# Patient Record
Sex: FEMALE | Race: BLACK OR AFRICAN AMERICAN | URBAN - METROPOLITAN AREA
[De-identification: names, ages, dates, MRNs, and addresses within clinical notes are randomized per-mention and may not be internally consistent; named-entity substitution may affect disease eponyms.]

---

## 2017-12-02 PROBLEM — E87.1 HYPONATREMIA: Status: ACTIVE | Noted: 2017-12-02

## 2017-12-03 PROBLEM — I48.20 CHRONIC ATRIAL FIBRILLATION (HCC): Status: ACTIVE | Noted: 2017-12-03

## 2017-12-03 PROBLEM — N39.0 URINARY TRACT INFECTION WITHOUT HEMATURIA: Status: ACTIVE | Noted: 2017-12-03

## 2017-12-03 PROBLEM — M21.331 WRIST DROP, ACQUIRED, RIGHT: Status: ACTIVE | Noted: 2017-12-03

## 2017-12-03 PROBLEM — I10 ESSENTIAL HYPERTENSION: Status: ACTIVE | Noted: 2017-12-03

## 2018-01-05 ENCOUNTER — TELEPHONE (OUTPATIENT)
Dept: ORTHOPEDIC SURGERY | Age: 78
End: 2018-01-05

## 2018-01-05 ENCOUNTER — OFFICE VISIT (OUTPATIENT)
Dept: ORTHOPEDIC SURGERY | Age: 78
End: 2018-01-05

## 2018-01-05 DIAGNOSIS — M21.331 WRIST DROP, ACQUIRED, RIGHT: Primary | ICD-10-CM

## 2018-01-05 PROCEDURE — 99213 OFFICE O/P EST LOW 20 MIN: CPT | Performed by: ORTHOPAEDIC SURGERY

## 2018-01-05 PROCEDURE — L3908 WHO COCK-UP NONMOLDE PRE OTS: HCPCS | Performed by: ORTHOPAEDIC SURGERY

## 2018-01-05 NOTE — PROGRESS NOTES
lesions. Strength and tone are normal.          Assessment :  Right wrist drop    Impression:  Encounter Diagnosis   Name Primary?  Wrist drop, acquired, right Yes       Office Procedures:  No orders of the defined types were placed in this encounter. Treatment Plan:  I discussed diagnosis and prognosis with her today. Going to place her into a cockup wrist brace. I did discuss that this is just going to require time and it could take up to a year plus for this to fully return. In going to see her back in 6 weeks to see if she is getting any return of the radial nerve.   If no return we could consider dictating an EMG at that time

## 2023-09-18 ENCOUNTER — APPOINTMENT (OUTPATIENT)
Dept: GENERAL RADIOLOGY | Age: 83
DRG: 058 | End: 2023-09-18
Payer: MEDICAID

## 2023-09-18 ENCOUNTER — HOSPITAL ENCOUNTER (INPATIENT)
Age: 83
LOS: 3 days | Discharge: HOME | DRG: 058 | End: 2023-09-21
Attending: EMERGENCY MEDICINE | Admitting: STUDENT IN AN ORGANIZED HEALTH CARE EDUCATION/TRAINING PROGRAM
Payer: MEDICAID

## 2023-09-18 ENCOUNTER — APPOINTMENT (OUTPATIENT)
Dept: CT IMAGING | Age: 83
DRG: 058 | End: 2023-09-18
Payer: MEDICAID

## 2023-09-18 DIAGNOSIS — E83.42 HYPOMAGNESEMIA: ICD-10-CM

## 2023-09-18 DIAGNOSIS — E87.6 HYPOKALEMIA: ICD-10-CM

## 2023-09-18 DIAGNOSIS — R47.1 DYSARTHRIA: ICD-10-CM

## 2023-09-18 DIAGNOSIS — R53.1 LEFT-SIDED WEAKNESS: Primary | ICD-10-CM

## 2023-09-18 DIAGNOSIS — N39.0 ACUTE LOWER UTI: ICD-10-CM

## 2023-09-18 PROBLEM — R29.810 FACIAL DROOP: Status: ACTIVE | Noted: 2023-09-18

## 2023-09-18 LAB
ACANTHOCYTES BLD QL SMEAR: ABNORMAL
ALBUMIN SERPL-MCNC: 3 G/DL (ref 3.4–5)
ALBUMIN/GLOB SERPL: 0.9 {RATIO} (ref 1.1–2.2)
ALP SERPL-CCNC: 105 U/L (ref 40–129)
ALT SERPL-CCNC: 14 U/L (ref 10–40)
ANION GAP SERPL CALCULATED.3IONS-SCNC: 12 MMOL/L (ref 3–16)
ANISOCYTOSIS BLD QL SMEAR: ABNORMAL
AST SERPL-CCNC: 24 U/L (ref 15–37)
BACTERIA URNS QL MICRO: ABNORMAL /HPF
BASOPHILS # BLD: 0 K/UL (ref 0–0.2)
BASOPHILS NFR BLD: 0 %
BILIRUB SERPL-MCNC: 0.3 MG/DL (ref 0–1)
BILIRUB UR QL STRIP.AUTO: NEGATIVE
BUN SERPL-MCNC: 14 MG/DL (ref 7–20)
BURR CELLS BLD QL SMEAR: ABNORMAL
CALCIUM SERPL-MCNC: 8.4 MG/DL (ref 8.3–10.6)
CHARACTER UR: ABNORMAL
CHLORIDE SERPL-SCNC: 100 MMOL/L (ref 99–110)
CLARITY UR: ABNORMAL
CO2 SERPL-SCNC: 24 MMOL/L (ref 21–32)
COLOR UR: YELLOW
CREAT SERPL-MCNC: 0.9 MG/DL (ref 0.6–1.2)
DEPRECATED RDW RBC AUTO: 13.5 % (ref 12.4–15.4)
EOSINOPHIL # BLD: 0 K/UL (ref 0–0.6)
EOSINOPHIL NFR BLD: 0 %
EPI CELLS #/AREA URNS AUTO: 32 /HPF (ref 0–5)
GFR SERPLBLD CREATININE-BSD FMLA CKD-EPI: >60 ML/MIN/{1.73_M2}
GLUCOSE SERPL-MCNC: 178 MG/DL (ref 70–99)
GLUCOSE UR STRIP.AUTO-MCNC: NEGATIVE MG/DL
HCT VFR BLD AUTO: 30.2 % (ref 36–48)
HGB BLD-MCNC: 10 G/DL (ref 12–16)
HGB UR QL STRIP.AUTO: ABNORMAL
HYPOCHROMIA BLD QL SMEAR: ABNORMAL
INR PPP: 1.17 (ref 0.84–1.16)
KETONES UR STRIP.AUTO-MCNC: ABNORMAL MG/DL
LEUKOCYTE ESTERASE UR QL STRIP.AUTO: ABNORMAL
LYMPHOCYTES # BLD: 1.8 K/UL (ref 1–5.1)
LYMPHOCYTES NFR BLD: 13 %
MAGNESIUM SERPL-MCNC: 1.7 MG/DL (ref 1.8–2.4)
MCH RBC QN AUTO: 28.1 PG (ref 26–34)
MCHC RBC AUTO-ENTMCNC: 32.9 G/DL (ref 31–36)
MCV RBC AUTO: 85.4 FL (ref 80–100)
METAMYELOCYTES NFR BLD MANUAL: 1 %
MONOCYTES # BLD: 1 K/UL (ref 0–1.3)
MONOCYTES NFR BLD: 7 %
NEUTROPHILS # BLD: 10.9 K/UL (ref 1.7–7.7)
NEUTROPHILS NFR BLD: 72 %
NEUTS BAND NFR BLD MANUAL: 7 % (ref 0–7)
NITRITE UR QL STRIP.AUTO: NEGATIVE
PH UR STRIP.AUTO: 5.5 [PH] (ref 5–8)
PLATELET # BLD AUTO: 278 K/UL (ref 135–450)
PLATELET BLD QL SMEAR: ADEQUATE
PMV BLD AUTO: 7.6 FL (ref 5–10.5)
POLYCHROMASIA BLD QL SMEAR: ABNORMAL
POTASSIUM SERPL-SCNC: 3.3 MMOL/L (ref 3.5–5.1)
PROT SERPL-MCNC: 6.3 G/DL (ref 6.4–8.2)
PROT UR STRIP.AUTO-MCNC: 100 MG/DL
PROTHROMBIN TIME: 15 SEC (ref 11.5–14.8)
RBC # BLD AUTO: 3.54 M/UL (ref 4–5.2)
RBC #/AREA URNS HPF: ABNORMAL /HPF (ref 0–4)
RENAL EPI CELLS #/AREA UR COMP ASSIST: ABNORMAL /HPF (ref 0–1)
SLIDE REVIEW: ABNORMAL
SODIUM SERPL-SCNC: 136 MMOL/L (ref 136–145)
SP GR UR STRIP.AUTO: 1.01 (ref 1–1.03)
TROPONIN, HIGH SENSITIVITY: 22 NG/L (ref 0–14)
UA DIPSTICK W REFLEX MICRO PNL UR: YES
URN SPEC COLLECT METH UR: ABNORMAL
UROBILINOGEN UR STRIP-ACNC: 1 E.U./DL
WBC # BLD AUTO: 13.6 K/UL (ref 4–11)
WBC #/AREA URNS AUTO: 221 /HPF (ref 0–5)

## 2023-09-18 PROCEDURE — 80053 COMPREHEN METABOLIC PANEL: CPT

## 2023-09-18 PROCEDURE — 83735 ASSAY OF MAGNESIUM: CPT

## 2023-09-18 PROCEDURE — 81001 URINALYSIS AUTO W/SCOPE: CPT

## 2023-09-18 PROCEDURE — 6360000004 HC RX CONTRAST MEDICATION: Performed by: EMERGENCY MEDICINE

## 2023-09-18 PROCEDURE — 2580000003 HC RX 258: Performed by: EMERGENCY MEDICINE

## 2023-09-18 PROCEDURE — 71045 X-RAY EXAM CHEST 1 VIEW: CPT

## 2023-09-18 PROCEDURE — 6360000002 HC RX W HCPCS: Performed by: EMERGENCY MEDICINE

## 2023-09-18 PROCEDURE — 85610 PROTHROMBIN TIME: CPT

## 2023-09-18 PROCEDURE — 93005 ELECTROCARDIOGRAM TRACING: CPT | Performed by: EMERGENCY MEDICINE

## 2023-09-18 PROCEDURE — 85025 COMPLETE CBC W/AUTO DIFF WBC: CPT

## 2023-09-18 PROCEDURE — 36415 COLL VENOUS BLD VENIPUNCTURE: CPT

## 2023-09-18 PROCEDURE — 70498 CT ANGIOGRAPHY NECK: CPT

## 2023-09-18 PROCEDURE — 96365 THER/PROPH/DIAG IV INF INIT: CPT

## 2023-09-18 PROCEDURE — 70450 CT HEAD/BRAIN W/O DYE: CPT

## 2023-09-18 PROCEDURE — 99285 EMERGENCY DEPT VISIT HI MDM: CPT

## 2023-09-18 PROCEDURE — 2000000000 HC ICU R&B

## 2023-09-18 PROCEDURE — 84484 ASSAY OF TROPONIN QUANT: CPT

## 2023-09-18 RX ORDER — DOCUSATE SODIUM 100 MG/1
100 CAPSULE, LIQUID FILLED ORAL 2 TIMES DAILY
COMMUNITY

## 2023-09-18 RX ORDER — RISPERIDONE 0.5 MG/1
0.5 TABLET ORAL DAILY
COMMUNITY

## 2023-09-18 RX ORDER — LANOLIN ALCOHOL/MO/W.PET/CERES
6 CREAM (GRAM) TOPICAL NIGHTLY
COMMUNITY

## 2023-09-18 RX ORDER — MIRTAZAPINE 7.5 MG/1
7.5 TABLET, FILM COATED ORAL NIGHTLY
COMMUNITY

## 2023-09-18 RX ORDER — OLANZAPINE 2.5 MG/1
2.5 TABLET ORAL
COMMUNITY

## 2023-09-18 RX ORDER — ESTRADIOL 0.1 MG/G
2 CREAM VAGINAL DAILY
COMMUNITY

## 2023-09-18 RX ORDER — AMLODIPINE BESYLATE 10 MG/1
10 TABLET ORAL NIGHTLY
COMMUNITY

## 2023-09-18 RX ORDER — POTASSIUM CHLORIDE 20 MEQ/1
20 TABLET, EXTENDED RELEASE ORAL 2 TIMES DAILY
COMMUNITY

## 2023-09-18 RX ORDER — BISACODYL 10 MG
10 SUPPOSITORY, RECTAL RECTAL DAILY PRN
COMMUNITY

## 2023-09-18 RX ORDER — MAGNESIUM SULFATE IN WATER 40 MG/ML
2000 INJECTION, SOLUTION INTRAVENOUS ONCE
Status: COMPLETED | OUTPATIENT
Start: 2023-09-18 | End: 2023-09-19

## 2023-09-18 RX ORDER — POTASSIUM CHLORIDE 7.45 MG/ML
10 INJECTION INTRAVENOUS
Status: DISPENSED | OUTPATIENT
Start: 2023-09-18 | End: 2023-09-19

## 2023-09-18 RX ORDER — METOPROLOL SUCCINATE 100 MG/1
100 TABLET, EXTENDED RELEASE ORAL DAILY
COMMUNITY

## 2023-09-18 RX ADMIN — IOPAMIDOL 75 ML: 755 INJECTION, SOLUTION INTRAVENOUS at 20:45

## 2023-09-18 RX ADMIN — CEFTRIAXONE SODIUM 1000 MG: 1 INJECTION, POWDER, FOR SOLUTION INTRAMUSCULAR; INTRAVENOUS at 23:22

## 2023-09-18 ASSESSMENT — PAIN - FUNCTIONAL ASSESSMENT: PAIN_FUNCTIONAL_ASSESSMENT: NONE - DENIES PAIN

## 2023-09-19 ENCOUNTER — APPOINTMENT (OUTPATIENT)
Dept: MRI IMAGING | Age: 83
DRG: 058 | End: 2023-09-19
Payer: MEDICAID

## 2023-09-19 LAB
ANION GAP SERPL CALCULATED.3IONS-SCNC: 12 MMOL/L (ref 3–16)
BUN SERPL-MCNC: 10 MG/DL (ref 7–20)
C DIFF TOX A+B STL QL IA: NORMAL
CALCIUM SERPL-MCNC: 8.8 MG/DL (ref 8.3–10.6)
CHLORIDE SERPL-SCNC: 104 MMOL/L (ref 99–110)
CHOLEST SERPL-MCNC: 103 MG/DL (ref 0–199)
CO2 SERPL-SCNC: 24 MMOL/L (ref 21–32)
CREAT SERPL-MCNC: 0.6 MG/DL (ref 0.6–1.2)
DEPRECATED RDW RBC AUTO: 13.4 % (ref 12.4–15.4)
EKG ATRIAL RATE: 61 BPM
EKG DIAGNOSIS: NORMAL
EKG Q-T INTERVAL: 444 MS
EKG QRS DURATION: 80 MS
EKG QTC CALCULATION (BAZETT): 458 MS
EKG R AXIS: 52 DEGREES
EKG T AXIS: 30 DEGREES
EKG VENTRICULAR RATE: 64 BPM
EST. AVERAGE GLUCOSE BLD GHB EST-MCNC: 99.7 MG/DL
GFR SERPLBLD CREATININE-BSD FMLA CKD-EPI: >60 ML/MIN/{1.73_M2}
GLUCOSE SERPL-MCNC: 107 MG/DL (ref 70–99)
HBA1C MFR BLD: 5.1 %
HCT VFR BLD AUTO: 32.6 % (ref 36–48)
HDLC SERPL-MCNC: 49 MG/DL (ref 40–60)
HGB BLD-MCNC: 10.9 G/DL (ref 12–16)
LDLC SERPL CALC-MCNC: 46 MG/DL
MAGNESIUM SERPL-MCNC: 2.7 MG/DL (ref 1.8–2.4)
MCH RBC QN AUTO: 28.5 PG (ref 26–34)
MCHC RBC AUTO-ENTMCNC: 33.4 G/DL (ref 31–36)
MCV RBC AUTO: 85.3 FL (ref 80–100)
PLATELET # BLD AUTO: 279 K/UL (ref 135–450)
PMV BLD AUTO: 7.4 FL (ref 5–10.5)
POTASSIUM SERPL-SCNC: 3.8 MMOL/L (ref 3.5–5.1)
RBC # BLD AUTO: 3.82 M/UL (ref 4–5.2)
SODIUM SERPL-SCNC: 140 MMOL/L (ref 136–145)
TRIGL SERPL-MCNC: 38 MG/DL (ref 0–150)
VLDLC SERPL CALC-MCNC: 8 MG/DL
WBC # BLD AUTO: 16.1 K/UL (ref 4–11)

## 2023-09-19 PROCEDURE — 2580000003 HC RX 258: Performed by: STUDENT IN AN ORGANIZED HEALTH CARE EDUCATION/TRAINING PROGRAM

## 2023-09-19 PROCEDURE — 70551 MRI BRAIN STEM W/O DYE: CPT

## 2023-09-19 PROCEDURE — APPSS45 APP SPLIT SHARED TIME 31-45 MINUTES

## 2023-09-19 PROCEDURE — 87449 NOS EACH ORGANISM AG IA: CPT

## 2023-09-19 PROCEDURE — 6370000000 HC RX 637 (ALT 250 FOR IP): Performed by: STUDENT IN AN ORGANIZED HEALTH CARE EDUCATION/TRAINING PROGRAM

## 2023-09-19 PROCEDURE — 80061 LIPID PANEL: CPT

## 2023-09-19 PROCEDURE — 85027 COMPLETE CBC AUTOMATED: CPT

## 2023-09-19 PROCEDURE — 92610 EVALUATE SWALLOWING FUNCTION: CPT

## 2023-09-19 PROCEDURE — APPNB30 APP NON BILLABLE TIME 0-30 MINS

## 2023-09-19 PROCEDURE — 6360000002 HC RX W HCPCS: Performed by: EMERGENCY MEDICINE

## 2023-09-19 PROCEDURE — 83036 HEMOGLOBIN GLYCOSYLATED A1C: CPT

## 2023-09-19 PROCEDURE — 6360000002 HC RX W HCPCS: Performed by: INTERNAL MEDICINE

## 2023-09-19 PROCEDURE — 2000000000 HC ICU R&B

## 2023-09-19 PROCEDURE — 83735 ASSAY OF MAGNESIUM: CPT

## 2023-09-19 PROCEDURE — 6360000002 HC RX W HCPCS: Performed by: STUDENT IN AN ORGANIZED HEALTH CARE EDUCATION/TRAINING PROGRAM

## 2023-09-19 PROCEDURE — 92523 SPEECH SOUND LANG COMPREHEN: CPT

## 2023-09-19 PROCEDURE — 93010 ELECTROCARDIOGRAM REPORT: CPT | Performed by: INTERNAL MEDICINE

## 2023-09-19 PROCEDURE — 80048 BASIC METABOLIC PNL TOTAL CA: CPT

## 2023-09-19 PROCEDURE — 92526 ORAL FUNCTION THERAPY: CPT

## 2023-09-19 PROCEDURE — 87324 CLOSTRIDIUM AG IA: CPT

## 2023-09-19 RX ORDER — ENOXAPARIN SODIUM 100 MG/ML
40 INJECTION SUBCUTANEOUS DAILY
Status: DISCONTINUED | OUTPATIENT
Start: 2023-09-19 | End: 2023-09-19

## 2023-09-19 RX ORDER — ONDANSETRON 4 MG/1
4 TABLET, ORALLY DISINTEGRATING ORAL EVERY 8 HOURS PRN
Status: DISCONTINUED | OUTPATIENT
Start: 2023-09-19 | End: 2023-09-21 | Stop reason: HOSPADM

## 2023-09-19 RX ORDER — ASPIRIN 325 MG
325 TABLET ORAL DAILY
Status: DISCONTINUED | OUTPATIENT
Start: 2023-09-19 | End: 2023-09-21 | Stop reason: HOSPADM

## 2023-09-19 RX ORDER — POLYETHYLENE GLYCOL 3350 17 G/17G
17 POWDER, FOR SOLUTION ORAL DAILY PRN
Status: DISCONTINUED | OUTPATIENT
Start: 2023-09-19 | End: 2023-09-21 | Stop reason: HOSPADM

## 2023-09-19 RX ORDER — SODIUM CHLORIDE 0.9 % (FLUSH) 0.9 %
5-40 SYRINGE (ML) INJECTION PRN
Status: DISCONTINUED | OUTPATIENT
Start: 2023-09-19 | End: 2023-09-21 | Stop reason: HOSPADM

## 2023-09-19 RX ORDER — RISPERIDONE 1 MG/1
0.5 TABLET ORAL DAILY
Status: DISCONTINUED | OUTPATIENT
Start: 2023-09-19 | End: 2023-09-21 | Stop reason: HOSPADM

## 2023-09-19 RX ORDER — ATORVASTATIN CALCIUM 40 MG/1
40 TABLET, FILM COATED ORAL NIGHTLY
Status: DISCONTINUED | OUTPATIENT
Start: 2023-09-19 | End: 2023-09-21 | Stop reason: HOSPADM

## 2023-09-19 RX ORDER — POTASSIUM CHLORIDE 7.45 MG/ML
10 INJECTION INTRAVENOUS
Status: COMPLETED | OUTPATIENT
Start: 2023-09-19 | End: 2023-09-19

## 2023-09-19 RX ORDER — MIRTAZAPINE 15 MG/1
7.5 TABLET, FILM COATED ORAL NIGHTLY
Status: DISCONTINUED | OUTPATIENT
Start: 2023-09-19 | End: 2023-09-20

## 2023-09-19 RX ORDER — ENOXAPARIN SODIUM 100 MG/ML
30 INJECTION SUBCUTANEOUS DAILY
Status: DISCONTINUED | OUTPATIENT
Start: 2023-09-19 | End: 2023-09-21 | Stop reason: HOSPADM

## 2023-09-19 RX ORDER — SODIUM CHLORIDE 9 MG/ML
INJECTION, SOLUTION INTRAVENOUS PRN
Status: DISCONTINUED | OUTPATIENT
Start: 2023-09-19 | End: 2023-09-21 | Stop reason: HOSPADM

## 2023-09-19 RX ORDER — SODIUM CHLORIDE 0.9 % (FLUSH) 0.9 %
5-40 SYRINGE (ML) INJECTION EVERY 12 HOURS SCHEDULED
Status: DISCONTINUED | OUTPATIENT
Start: 2023-09-19 | End: 2023-09-21 | Stop reason: HOSPADM

## 2023-09-19 RX ORDER — OLANZAPINE 5 MG/1
2.5 TABLET ORAL
Status: DISCONTINUED | OUTPATIENT
Start: 2023-09-19 | End: 2023-09-21 | Stop reason: HOSPADM

## 2023-09-19 RX ORDER — ONDANSETRON 2 MG/ML
4 INJECTION INTRAMUSCULAR; INTRAVENOUS EVERY 6 HOURS PRN
Status: DISCONTINUED | OUTPATIENT
Start: 2023-09-19 | End: 2023-09-21 | Stop reason: HOSPADM

## 2023-09-19 RX ADMIN — CEFTRIAXONE SODIUM 1000 MG: 1 INJECTION, POWDER, FOR SOLUTION INTRAMUSCULAR; INTRAVENOUS at 10:56

## 2023-09-19 RX ADMIN — POTASSIUM CHLORIDE 10 MEQ: 7.46 INJECTION, SOLUTION INTRAVENOUS at 02:09

## 2023-09-19 RX ADMIN — OLANZAPINE 2.5 MG: 5 TABLET, FILM COATED ORAL at 10:59

## 2023-09-19 RX ADMIN — ENOXAPARIN SODIUM 30 MG: 100 INJECTION SUBCUTANEOUS at 19:06

## 2023-09-19 RX ADMIN — SODIUM CHLORIDE: 9 INJECTION, SOLUTION INTRAVENOUS at 02:04

## 2023-09-19 RX ADMIN — ASPIRIN 325 MG: 325 TABLET ORAL at 10:59

## 2023-09-19 RX ADMIN — RISPERIDONE 0.5 MG: 1 TABLET ORAL at 10:59

## 2023-09-19 RX ADMIN — POTASSIUM CHLORIDE 10 MEQ: 7.46 INJECTION, SOLUTION INTRAVENOUS at 03:18

## 2023-09-19 RX ADMIN — MAGNESIUM SULFATE HEPTAHYDRATE 2000 MG: 40 INJECTION, SOLUTION INTRAVENOUS at 00:55

## 2023-09-19 ASSESSMENT — PAIN SCALES - PAIN ASSESSMENT IN ADVANCED DEMENTIA (PAINAD)
FACIALEXPRESSION: 1
BREATHING: 0
BODYLANGUAGE: 2
CONSOLABILITY: 0
TOTALSCORE: 3
NEGVOCALIZATION: 0

## 2023-09-19 ASSESSMENT — PAIN SCALES - GENERAL: PAINLEVEL_OUTOF10: 3

## 2023-09-19 NOTE — ED PROVIDER NOTES
EMERGENCY MEDICINE ATTENDING NOTE  Ana Rosa Schreiber. Ananya Barnett.DO FACEP, [facility]        CHIEF COMPLAINT  Chief Complaint   Patient presents with    Facial Droop     Pt in from ems from nursing home where her last known normal was 7pm, patient has facial droop and sever aphasia. HISTORY OF PRESENT ILLNESS  Hyacinth Welsh is a 80 y.o. female who presents to the ED for evaluation of possible stroke. Patient was last seen normal around 7 PM at the nursing home. They had went to give her her dinner at that time. When they went to collect the plate they noticed that she was not her normal self. She is usually conversant however had severely slurred speech and they noticed left-sided facial droop and left-sided arm weakness. 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 Per EMS unknown of any history of stroke in the past and not on any anticoagulants. History is otherwise limited due to the patient's dementia and severe dysarthria. Nursing/triage notes reviewed. No other complaints, modifying factors or associated symptoms. REVIEW OF SYSTEMS:  Unable to fully assess secondary to mental status. 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)     Acute cognitive decline     Acute kidney failure ([address])     Alzheimer's disease ([address])     Anemia     Atherosclerotic cardiovascular disease     Atrial fibrillation (HCC)     Caloric malnutrition ([address])     Cerebral infarction due to embolism ([address])     Dementia ([address])     Difficulty in walking     Dysphagia     Generalized muscle weakness     Hyperlipidemia     Hypertension     Other symbolic dysfunctions (CODE)     Peripheral vascular disease ([address])     Primary generalized (osteo)arthritis        SURGICAL HISTORY  Past Surgical History:   Procedure Laterality Date    ABOVE KNEE AMPUTATION      left       FAMILY HISTORY  History reviewed. No pertinent family history.     SOCIAL

## 2023-09-19 NOTE — H&P
V2.0  History and Physical      Name:  Subha Srinivasan /Age/Sex: 1940  (80 y.o. female)   MRN & CSN:  [id number] & [id number] Encounter Date/Time: 2023 11:58 PM EDT   Location:  [address] PCP: Santiago Doty MD       Hospital Day: 2    Assessment and Plan:   Subha Srinivasan is a 80 y.o. female with a pmh of CKD, CAD, Dementia, HTN, PVD, thyroid disease, Hx of CVA, anemia who presents with Facial droop    Hospital Problems             Last Modified POA    * (Principal) Facial droop 2023 Yes       Plan:  Left sided facial droop:  - CVA  -Patient was presented to nursing home to ED for chief complaint of left-sided facial droop and slurred speech. Patient has history of dementia and is poor historian.  -In the ED, patient was hemodynamically stable. CT head was done and showed No acute intracranial abnormality. -CTA of the head and neck was done and showed multiple areas of significant stenosis.  - stroke team was called and recommended no TNK.  -Neuro check every hour  -SLP  -Statin  -Aspirin  -Permissive hypertension    2. UTI:  -WBC 13.6. UA was positive for large leukocyte esterase.  -Urine culture  -Bladder scan every shift  -Ceftriaxone    3. Dementia:  -Continue home medications    4. History of A-fib:  -Not on anticoagulation  -Holding beta-blocker for now. Disposition:   Current Living situation: Nursing home  Expected Disposition: Nursing,  Estimated D/C: 2 to 3 days    Diet ADULT DIET;  Regular; 3 carb choices (45 gm/meal)   DVT Prophylaxis [] Lovenox, []  Heparin, [] SCDs, [] Ambulation,  [] Eliquis, [] Xarelto, [] Coumadin   Code Status Full Code   Surrogate Decision Maker/ POA Grandchild           History from:     patient and EMR    History of Present Illness:     Chief Complaint: Left facial droop  Subha Srinivasan is a 80 y.o. female with pmh of CKD, CAD, Dementia, HTN, PVD, thyroid disease, Hx of CVA, anemia  who presents with chief complaint of left facial

## 2023-09-19 NOTE — ED NOTES
ED TO INPATIENT SBAR HANDOFF    Patient Name: Emily Michelle   :  1940  80 y.o. MRN:  [id number]  [facility]  ED Room #:  ED-0001/01  Family/Caregiver Present yes   Restraints no   Sitter no   Sepsis Risk Score Sepsis Risk Score: 3.21    Situation  Code Status: No Order No additional code details. Allergies: Penicillins  Weight: Patient Vitals for the past 96 hrs (Last 3 readings):   Weight   23 2049 121 lb 4.1 oz (55 kg)     Arrived from: nursing home  Chief Complaint:   Chief Complaint   Patient presents with    Facial Droop     Pt in from ems from nursing home where her last known normal was 7pm, patient has facial droop and sever aphasia. Hospital Problem/Diagnosis:  Principal Problem:    Facial droop  Resolved Problems:    * No resolved hospital problems. *    Imaging:   XR CHEST PORTABLE   Final Result   Mild pulmonary vascular congestion. Stable mild cardiomegaly. CTA HEAD NECK W CONTRAST   Final Result   1. No acute intracranial abnormality. 2. Severe stenosis in the mid P2 segment of the right PCA. 3. 50% stenosis in the cavernous/supraclinoid segment of the left internal   carotid artery. 4. 70% stenosis in the V1 segment of the left vertebral artery. 5. 50% stenosis at the origin of the right vertebral artery. 6. 50% stenosis in the proximal right internal carotid artery. 7. 40% stenosis at the origin of the left subclavian artery. 8. 1.7 cm right thyroid nodule. Follow-up recommendation is listed below. RECOMMENDATIONS:   Pathology: 1.7 cm incidental right thyroid nodule. Recommend nonemergent   thyroid US. Reference: J Am Cathie Radiol. 2015 Feb;12(2): 143-50         CT HEAD WO CONTRAST   Final Result   No acute intracranial abnormality. Old infarctions in the right frontal parietal occipital lobes. Mild parenchymal volume loss. Moderate chronic microvascular disease. Opacification of the left mastoid air cells.

## 2023-09-19 NOTE — CONSULTS
In patient Neurology consult        [facility] Neu[facility]      MD Kay Molina  1940    Date of Service: 9/19/2023    Referring Physician: Irma Beckford MD    Most of the history was obtained from detailed chart reviewing and discussion with the patient's family and nurse. The patient is currently confused and unable to provide me with accurate history. Reason for the consult and CC: Acute encephalopathy and left facial droop and slurred speech. HPI:   The patient is a 80y.o.  years old female with past history of hypertension, remote strokes, chronic cognitive impairment, bilateral AKA and other medical problems, comes to the hospital with left facial droop and slurred speech. Her symptom started yesterday evening while eating dinner. First noticed by her granddaughter who are visiting. Family reports patient has history of prior stroke with chronic left-sided facial droop, and left-sided weakness and it seemed worse than usual.  Degree severe, duration unknown for few minutes to hours. She also has left eye vision impairment    Patient with emergency room where CT head showed no acute abnormality. CTA head and neck showed multifocal intracranial stenosis. Lab work-up showed leukocytosis, anemia, UA positive for UTI and hypokalemia. Stroke team was consulted and it was current patient was not a TNK candidate. Patient was admitted to the hospital further evaluation. Patient granddaughter at bedside today who reports patient has history of multiple strokes. Patient has history of A-fib but is not currently anticoagulated anemia/bleeding history. Patient had Watchman procedure 2-3 years ago. She is on aspirin 325 mg daily. I personally reviewed and updated social history, past medical history, medications, allergy, surgical history, and family history as documented in the patient's electronic health records.        ROS: 10-14 system review, reviewed with

## 2023-09-19 NOTE — CARE COORDINATION
Discharge Planning Note:    CM called and LVM for [facility] at [facility] to confirm pt's residency status. Return call requested.      Electronically signed by Oziel Zamora RN on 9/19/2023 at 11:27 AM

## 2023-09-19 NOTE — ED NOTES
Called the  @ 22355 for an incoming stroke alert called in the field per [facility] ([facility]) @ 2023. Called the stroke Team @ 2034 upon Dr. Zachary Rodriguez assessment of the pt in CT. Stroke Team called back @ 2041 and spoke with Dr. Erin Sena about the pt's case. Stroke Team called back @ 2054 in regard to CTs. Stated to stroke team films have been pushed and should be in PACS to be reviewed. [facility] called @ back 2101 and spoke with Dr. Erin Sena  about the pt's scans.       [employer]  09/18/23 2053       [employer]  09/18/23 2105

## 2023-09-20 LAB
DEPRECATED RDW RBC AUTO: 13.6 % (ref 12.4–15.4)
HCT VFR BLD AUTO: 36.2 % (ref 36–48)
HGB BLD-MCNC: 12 G/DL (ref 12–16)
MCH RBC QN AUTO: 28.6 PG (ref 26–34)
MCHC RBC AUTO-ENTMCNC: 33.1 G/DL (ref 31–36)
MCV RBC AUTO: 86.4 FL (ref 80–100)
PLATELET # BLD AUTO: 282 K/UL (ref 135–450)
PMV BLD AUTO: 7.7 FL (ref 5–10.5)
RBC # BLD AUTO: 4.19 M/UL (ref 4–5.2)
WBC # BLD AUTO: 12.5 K/UL (ref 4–11)

## 2023-09-20 PROCEDURE — 6370000000 HC RX 637 (ALT 250 FOR IP): Performed by: STUDENT IN AN ORGANIZED HEALTH CARE EDUCATION/TRAINING PROGRAM

## 2023-09-20 PROCEDURE — 6370000000 HC RX 637 (ALT 250 FOR IP): Performed by: INTERNAL MEDICINE

## 2023-09-20 PROCEDURE — 85027 COMPLETE CBC AUTOMATED: CPT

## 2023-09-20 PROCEDURE — 97129 THER IVNTJ 1ST 15 MIN: CPT

## 2023-09-20 PROCEDURE — 94760 N-INVAS EAR/PLS OXIMETRY 1: CPT

## 2023-09-20 PROCEDURE — 99223 1ST HOSP IP/OBS HIGH 75: CPT | Performed by: PSYCHIATRY & NEUROLOGY

## 2023-09-20 PROCEDURE — 6360000002 HC RX W HCPCS: Performed by: INTERNAL MEDICINE

## 2023-09-20 PROCEDURE — 36415 COLL VENOUS BLD VENIPUNCTURE: CPT

## 2023-09-20 PROCEDURE — 92507 TX SP LANG VOICE COMM INDIV: CPT

## 2023-09-20 PROCEDURE — 51798 US URINE CAPACITY MEASURE: CPT

## 2023-09-20 PROCEDURE — 2000000000 HC ICU R&B

## 2023-09-20 PROCEDURE — 92526 ORAL FUNCTION THERAPY: CPT

## 2023-09-20 RX ORDER — MIRTAZAPINE 15 MG/1
7.5 TABLET, FILM COATED ORAL NIGHTLY
Status: DISCONTINUED | OUTPATIENT
Start: 2023-09-20 | End: 2023-09-21 | Stop reason: HOSPADM

## 2023-09-20 RX ADMIN — ENOXAPARIN SODIUM 30 MG: 100 INJECTION SUBCUTANEOUS at 09:51

## 2023-09-20 RX ADMIN — RISPERIDONE 0.5 MG: 1 TABLET ORAL at 09:51

## 2023-09-20 RX ADMIN — OLANZAPINE 2.5 MG: 5 TABLET, FILM COATED ORAL at 15:16

## 2023-09-20 RX ADMIN — ASPIRIN 325 MG: 325 TABLET ORAL at 09:51

## 2023-09-20 RX ADMIN — MIRTAZAPINE 7.5 MG: 15 TABLET, FILM COATED ORAL at 17:24

## 2023-09-20 RX ADMIN — ATORVASTATIN CALCIUM 40 MG: 40 TABLET, FILM COATED ORAL at 20:39

## 2023-09-20 ASSESSMENT — PAIN SCALES - PAIN ASSESSMENT IN ADVANCED DEMENTIA (PAINAD)
FACIALEXPRESSION: 0
BREATHING: 0
BREATHING: 0
NEGVOCALIZATION: 0
TOTALSCORE: 0
BREATHING: 0
BODYLANGUAGE: 0
TOTALSCORE: 0
NEGVOCALIZATION: 0
BODYLANGUAGE: 0
NEGVOCALIZATION: 0
CONSOLABILITY: 0
FACIALEXPRESSION: 0
BODYLANGUAGE: 0
NEGVOCALIZATION: 0
TOTALSCORE: 0
BREATHING: 0
TOTALSCORE: 0
FACIALEXPRESSION: 0
FACIALEXPRESSION: 1
NEGVOCALIZATION: 0
BODYLANGUAGE: 0
BREATHING: 0
FACIALEXPRESSION: 0
TOTALSCORE: 0
FACIALEXPRESSION: 0
BREATHING: 0
BODYLANGUAGE: 0
BODYLANGUAGE: 1
NEGVOCALIZATION: 0
CONSOLABILITY: 0
CONSOLABILITY: 0
FACIALEXPRESSION: 0
BREATHING: 0
CONSOLABILITY: 0
TOTALSCORE: 0
FACIALEXPRESSION: 0
CONSOLABILITY: 0
BREATHING: 0
BREATHING: 0
BODYLANGUAGE: 0
FACIALEXPRESSION: 0
BREATHING: 0
TOTALSCORE: 0
TOTALSCORE: 0
BODYLANGUAGE: 0
BODYLANGUAGE: 0
TOTALSCORE: 0
CONSOLABILITY: 0
CONSOLABILITY: 0
NEGVOCALIZATION: 0
CONSOLABILITY: 0
CONSOLABILITY: 0
FACIALEXPRESSION: 0
CONSOLABILITY: 0
NEGVOCALIZATION: 0
TOTALSCORE: 2
BODYLANGUAGE: 0

## 2023-09-20 ASSESSMENT — PAIN SCALES - GENERAL
PAINLEVEL_OUTOF10: 0
PAINLEVEL_OUTOF10: 2
PAINLEVEL_OUTOF10: 0

## 2023-09-20 NOTE — CARE COORDINATION
Case Management Assessment  Initial Evaluation    Date/Time of Evaluation: 9/20/2023 12:52 PM  Assessment Completed by: Yulia Larsen RN    If patient is discharged prior to next notation, then this note serves as note for discharge by case management. Patient Name: Rigoberto Coronado                   YOB: 1940  Diagnosis: Hypokalemia [E87.6]  Hypomagnesemia [E83.42]  Dysarthria [R47.1]  Facial droop [R29.810]  Left-sided weakness [R53.1]  Acute lower UTI [N39.0]                   Date / Time: 9/18/2023  8:41 PM    Patient Admission Status: Inpatient   Readmission Risk (Low < 19, Mod (19-27), High > 27): Readmission Risk Score: 9.7    Current PCP: Sushma Wu MD  PCP verified by CM? Yes (facility attending)    Chart Reviewed: Yes      History Provided by: Medical Record, Other (see comment) (facility)  Patient Orientation: Other (see comment)    Patient Cognition: Dementia / Early Alzheimer's    Hospitalization in the last 30 days (Readmission):  No    If yes, Readmission Assessment in CM Navigator will be completed. Advance Directives:      Code Status: Full Code   Patient's Primary Decision Maker is: Patient Declined (Legal Next of Kin Remains as Decision Maker)      Discharge Planning:    Patient expects to discharge to: Long-term care  Plan for transportation at discharge: Family    Financial    Payor: MEDICAID [place] / Plan: [address] [place] [address] DEPT OF JOB / Product Type: *No Product type* /         Current Nursing Home Information:  Patient admitted from:  [facility] AT [address]  Phone: [phone]  Fax: [phone]    Call to [address] [place] [address],[address], admission, who confirmed the [facility] is[facility] [address] [facility] [address] [place] [address], no Precert required for return.       Patient Covid vaccination status:    Internal Administration   First Dose      Second Dose           Last COVID Lab No results found for: \"SARS-COV-2\", \"SARS-COV-2 RNA\", \"SARS-COV-2 RNA, RT PCR\", \"SARS-COV-2, HEAVEN\", \"SARS-COV-2,

## 2023-09-21 VITALS
WEIGHT: 111.99 LBS | DIASTOLIC BLOOD PRESSURE: 91 MMHG | BODY MASS INDEX: 18 KG/M2 | SYSTOLIC BLOOD PRESSURE: 137 MMHG | HEART RATE: 101 BPM | TEMPERATURE: 97.4 F | HEIGHT: 66 IN | RESPIRATION RATE: 15 BRPM | OXYGEN SATURATION: 99 %

## 2023-09-21 PROCEDURE — 6360000002 HC RX W HCPCS: Performed by: INTERNAL MEDICINE

## 2023-09-21 PROCEDURE — 6370000000 HC RX 637 (ALT 250 FOR IP): Performed by: STUDENT IN AN ORGANIZED HEALTH CARE EDUCATION/TRAINING PROGRAM

## 2023-09-21 PROCEDURE — 92526 ORAL FUNCTION THERAPY: CPT

## 2023-09-21 PROCEDURE — 92507 TX SP LANG VOICE COMM INDIV: CPT

## 2023-09-21 PROCEDURE — 51798 US URINE CAPACITY MEASURE: CPT

## 2023-09-21 PROCEDURE — 2580000003 HC RX 258: Performed by: STUDENT IN AN ORGANIZED HEALTH CARE EDUCATION/TRAINING PROGRAM

## 2023-09-21 PROCEDURE — 2580000003 HC RX 258: Performed by: INTERNAL MEDICINE

## 2023-09-21 PROCEDURE — 2500000003 HC RX 250 WO HCPCS: Performed by: STUDENT IN AN ORGANIZED HEALTH CARE EDUCATION/TRAINING PROGRAM

## 2023-09-21 RX ORDER — METOPROLOL TARTRATE 5 MG/5ML
2.5 INJECTION INTRAVENOUS EVERY 6 HOURS PRN
Status: DISCONTINUED | OUTPATIENT
Start: 2023-09-21 | End: 2023-09-21 | Stop reason: HOSPADM

## 2023-09-21 RX ADMIN — OLANZAPINE 2.5 MG: 5 TABLET, FILM COATED ORAL at 12:40

## 2023-09-21 RX ADMIN — RISPERIDONE 0.5 MG: 1 TABLET ORAL at 09:52

## 2023-09-21 RX ADMIN — CEFTRIAXONE SODIUM 1000 MG: 1 INJECTION, POWDER, FOR SOLUTION INTRAMUSCULAR; INTRAVENOUS at 10:04

## 2023-09-21 RX ADMIN — ENOXAPARIN SODIUM 30 MG: 100 INJECTION SUBCUTANEOUS at 09:52

## 2023-09-21 RX ADMIN — ASPIRIN 325 MG: 325 TABLET ORAL at 09:52

## 2023-09-21 RX ADMIN — SODIUM CHLORIDE, PRESERVATIVE FREE 10 ML: 5 INJECTION INTRAVENOUS at 09:52

## 2023-09-21 RX ADMIN — METOPROLOL TARTRATE 2.5 MG: 5 INJECTION INTRAVENOUS at 01:13

## 2023-09-21 ASSESSMENT — PAIN SCALES - PAIN ASSESSMENT IN ADVANCED DEMENTIA (PAINAD)
BREATHING: 0
CONSOLABILITY: 0
BODYLANGUAGE: 0
NEGVOCALIZATION: 0
FACIALEXPRESSION: 0
BODYLANGUAGE: 0
CONSOLABILITY: 0
BODYLANGUAGE: 0
BREATHING: 0
CONSOLABILITY: 0
BREATHING: 0
NEGVOCALIZATION: 0
FACIALEXPRESSION: 0
BREATHING: 0
FACIALEXPRESSION: 0
TOTALSCORE: 0
BODYLANGUAGE: 0
NEGVOCALIZATION: 0
FACIALEXPRESSION: 0
NEGVOCALIZATION: 0
TOTALSCORE: 0
NEGVOCALIZATION: 0
TOTALSCORE: 0
BODYLANGUAGE: 0
TOTALSCORE: 0
TOTALSCORE: 0
BREATHING: 0
FACIALEXPRESSION: 0
CONSOLABILITY: 0
CONSOLABILITY: 0

## 2023-09-21 ASSESSMENT — PAIN SCALES - GENERAL
PAINLEVEL_OUTOF10: 0

## 2023-09-21 NOTE — CARE COORDINATION
Case Management -  Discharge Note      Patient Name: Subha Srinivasan                   YOB: 1940            Readmission Risk (Low < 19, Mod (19-27), High > 27): Readmission Risk Score: 9.5    Current PCP: Santiago Doty MD        Patient noted to have a discharge order.   Pt has been medically cleared to return to LTC ([facility] [address] [place] [address])    Patient discharged to   [facility] AT [address]  Phone: [phone]  Fax: [phone]         Pre-cert Required/obtained: no  Transportation scheduled for [employer] provided by [employer]  ([phone])    AVS faxed and agency notified: yes  The following prescriptions sent with pt:  none  Family Notified: LVM for POA  Nurse to call report to facility      Financial    Payor: MEDICAID [place] / Plan: [address] [place] [address] DEPT OF JOB / Product Type: *No Product type* /     Pharmacy:  Potential assistance Purchasing Medications: No  Meds-to-Beds request: No      [facility] [place]ym[place]ly Ng, [address] - [address]  [address] [address]  Phone: [phone] Fax: [phone]      Electronically signed by Zara Jamison RN on 9/21/2023 at 11:16 AM

## 2023-09-21 NOTE — DISCHARGE INSTRUCTIONS
May resume normal daily activities as tolerated. Please give all \"bedtime\" medications prior to 6pm due to increased behaviors/agitation the later it gets in the evening related to [employer].

## 2023-09-21 NOTE — PLAN OF CARE
Problem: Discharge Planning  Goal: Discharge to home or other facility with appropriate resources  Outcome: Completed  Flowsheets (Taken 9/21/2023 0800)  Discharge to home or other facility with appropriate resources:   Identify barriers to discharge with patient and caregiver   Arrange for needed discharge resources and transportation as appropriate   Identify discharge learning needs (meds, wound care, etc)   Refer to discharge planning if patient needs post-hospital services based on physician order or complex needs related to functional status, cognitive ability or social support system     Problem: Safety - Adult  Goal: Free from fall injury  Outcome: Completed     Problem: Pain  Goal: Verbalizes/displays adequate comfort level or baseline comfort level  Outcome: Completed  Flowsheets (Taken 9/21/2023 0800)  Verbalizes/displays adequate comfort level or baseline comfort level:   Encourage patient to monitor pain and request assistance   Assess pain using appropriate pain scale   Administer analgesics based on type and severity of pain and evaluate response   Implement non-pharmacological measures as appropriate and evaluate response   Consider cultural and social influences on pain and pain management   Notify Licensed Independent Practitioner if interventions unsuccessful or patient reports new pain     Problem: Chronic Conditions and Co-morbidities  Goal: Patient's chronic conditions and co-morbidity symptoms are monitored and maintained or improved  Outcome: Completed  Flowsheets (Taken 9/21/2023 0800)  Care Plan - Patient's Chronic Conditions and Co-Morbidity Symptoms are Monitored and Maintained or Improved:   Monitor and assess patient's chronic conditions and comorbid symptoms for stability, deterioration, or improvement   Collaborate with multidisciplinary team to address chronic and comorbid conditions and prevent exacerbation or deterioration   Update acute care plan with appropriate goals if

## 2023-09-21 NOTE — CARE COORDINATION
Discharge Planning Note:    DC order noted.      Transport Scheduled:  Time: [address] [facility]  ([phone])

## 2023-09-21 NOTE — DISCHARGE INSTR - DIET
Good nutrition is important when healing from an illness, injury, or surgery. Follow any nutrition recommendations given to you during your hospital stay. If you were given an oral nutrition supplement while in the hospital, continue to take this supplement at home. You can take it with meals, in-between meals, and/or before bedtime. These supplements can be purchased at most local grocery stores, pharmacies, and chain "[employer]-stores. If you have any questions about your diet or nutrition, call the hospital and ask for the dietitian.       Continue Carb Controlled diet with oral supplement- High Calorie/High Protein Ensure shakes with each meal.

## 2024-10-04 ENCOUNTER — HOSPITAL ENCOUNTER (EMERGENCY)
Age: 84
Discharge: HOME | End: 2024-10-04
Payer: MEDICAID

## 2024-10-04 ENCOUNTER — APPOINTMENT (OUTPATIENT)
Dept: GENERAL RADIOLOGY | Age: 84
End: 2024-10-04
Payer: MEDICAID

## 2024-10-04 VITALS
WEIGHT: 110 LBS | RESPIRATION RATE: 19 BRPM | BODY MASS INDEX: 17.75 KG/M2 | HEART RATE: 83 BPM | DIASTOLIC BLOOD PRESSURE: 67 MMHG | OXYGEN SATURATION: 100 % | SYSTOLIC BLOOD PRESSURE: 149 MMHG

## 2024-10-04 DIAGNOSIS — N39.0 URINARY TRACT INFECTION WITHOUT HEMATURIA, SITE UNSPECIFIED: Primary | ICD-10-CM

## 2024-10-04 DIAGNOSIS — I48.20 CHRONIC ATRIAL FIBRILLATION (HCC): ICD-10-CM

## 2024-10-04 DIAGNOSIS — R00.2 PALPITATIONS: ICD-10-CM

## 2024-10-04 LAB
ALBUMIN SERPL-MCNC: 3.5 G/DL (ref 3.4–5)
ALBUMIN/GLOB SERPL: 1.1 {RATIO} (ref 1.1–2.2)
ALP SERPL-CCNC: 103 U/L (ref 40–129)
ALT SERPL-CCNC: 11 U/L (ref 10–40)
ANION GAP SERPL CALCULATED.3IONS-SCNC: 11 MMOL/L (ref 3–16)
AST SERPL-CCNC: 20 U/L (ref 15–37)
BACTERIA URNS QL MICRO: ABNORMAL /HPF
BASOPHILS # BLD: 0 K/UL (ref 0–0.2)
BASOPHILS NFR BLD: 0 %
BILIRUB SERPL-MCNC: 0.5 MG/DL (ref 0–1)
BILIRUB UR QL STRIP.AUTO: NEGATIVE
BUN SERPL-MCNC: 10 MG/DL (ref 7–20)
BURR CELLS BLD QL SMEAR: ABNORMAL
CALCIUM SERPL-MCNC: 8.6 MG/DL (ref 8.3–10.6)
CHLORIDE SERPL-SCNC: 104 MMOL/L (ref 99–110)
CLARITY UR: CLEAR
CO2 SERPL-SCNC: 23 MMOL/L (ref 21–32)
COLOR UR: YELLOW
CREAT SERPL-MCNC: 0.7 MG/DL (ref 0.6–1.2)
DEPRECATED RDW RBC AUTO: 14.7 % (ref 12.4–15.4)
EOSINOPHIL # BLD: 0.1 K/UL (ref 0–0.6)
EOSINOPHIL NFR BLD: 1 %
EPI CELLS #/AREA URNS AUTO: 1 /HPF (ref 0–5)
FLUAV RNA RESP QL NAA+PROBE: NOT DETECTED
FLUBV RNA RESP QL NAA+PROBE: NOT DETECTED
GFR SERPLBLD CREATININE-BSD FMLA CKD-EPI: 85 ML/MIN/{1.73_M2}
GLUCOSE SERPL-MCNC: 136 MG/DL (ref 70–99)
GLUCOSE UR STRIP.AUTO-MCNC: NEGATIVE MG/DL
HCT VFR BLD AUTO: 33.8 % (ref 36–48)
HGB BLD-MCNC: 11.3 G/DL (ref 12–16)
HGB UR QL STRIP.AUTO: NEGATIVE
HYALINE CASTS #/AREA URNS AUTO: 0 /LPF (ref 0–8)
KETONES UR STRIP.AUTO-MCNC: NEGATIVE MG/DL
LEUKOCYTE ESTERASE UR QL STRIP.AUTO: ABNORMAL
LYMPHOCYTES # BLD: 1.2 K/UL (ref 1–5.1)
LYMPHOCYTES NFR BLD: 15 %
MCH RBC QN AUTO: 30.5 PG (ref 26–34)
MCHC RBC AUTO-ENTMCNC: 33.5 G/DL (ref 31–36)
MCV RBC AUTO: 91 FL (ref 80–100)
MONOCYTES # BLD: 0.4 K/UL (ref 0–1.3)
MONOCYTES NFR BLD: 5 %
NEUTROPHILS # BLD: 6.1 K/UL (ref 1.7–7.7)
NEUTROPHILS NFR BLD: 79 %
NITRITE UR QL STRIP.AUTO: NEGATIVE
PH UR STRIP.AUTO: 5.5 [PH] (ref 5–8)
PLATELET # BLD AUTO: 258 K/UL (ref 135–450)
PLATELET BLD QL SMEAR: ADEQUATE
PMV BLD AUTO: 8.1 FL (ref 5–10.5)
POTASSIUM SERPL-SCNC: 4 MMOL/L (ref 3.5–5.1)
PROT SERPL-MCNC: 6.7 G/DL (ref 6.4–8.2)
PROT UR STRIP.AUTO-MCNC: ABNORMAL MG/DL
RBC # BLD AUTO: 3.71 M/UL (ref 4–5.2)
RBC CLUMPS #/AREA URNS AUTO: 1 /HPF (ref 0–4)
SARS-COV-2 RNA RESP QL NAA+PROBE: NOT DETECTED
SCHISTOCYTES BLD QL SMEAR: ABNORMAL
SLIDE REVIEW: ABNORMAL
SODIUM SERPL-SCNC: 138 MMOL/L (ref 136–145)
SP GR UR STRIP.AUTO: <=1.005 (ref 1–1.03)
TROPONIN, HIGH SENSITIVITY: 14 NG/L (ref 0–14)
TROPONIN, HIGH SENSITIVITY: 16 NG/L (ref 0–14)
UA COMPLETE W REFLEX CULTURE PNL UR: YES
UA DIPSTICK W REFLEX MICRO PNL UR: YES
URN SPEC COLLECT METH UR: ABNORMAL
UROBILINOGEN UR STRIP-ACNC: 0.2 E.U./DL
WBC # BLD AUTO: 7.7 K/UL (ref 4–11)
WBC #/AREA URNS AUTO: 23 /HPF (ref 0–5)

## 2024-10-04 PROCEDURE — 87077 CULTURE AEROBIC IDENTIFY: CPT

## 2024-10-04 PROCEDURE — 87186 SC STD MICRODIL/AGAR DIL: CPT

## 2024-10-04 PROCEDURE — 71045 X-RAY EXAM CHEST 1 VIEW: CPT

## 2024-10-04 PROCEDURE — 80053 COMPREHEN METABOLIC PANEL: CPT

## 2024-10-04 PROCEDURE — 84484 ASSAY OF TROPONIN QUANT: CPT

## 2024-10-04 PROCEDURE — 85025 COMPLETE CBC W/AUTO DIFF WBC: CPT

## 2024-10-04 PROCEDURE — 99285 EMERGENCY DEPT VISIT HI MDM: CPT

## 2024-10-04 PROCEDURE — 87088 URINE BACTERIA CULTURE: CPT

## 2024-10-04 PROCEDURE — 81001 URINALYSIS AUTO W/SCOPE: CPT

## 2024-10-04 PROCEDURE — 87636 SARSCOV2 & INF A&B AMP PRB: CPT

## 2024-10-04 PROCEDURE — 93005 ELECTROCARDIOGRAM TRACING: CPT | Performed by: PHYSICIAN ASSISTANT

## 2024-10-04 PROCEDURE — 87086 URINE CULTURE/COLONY COUNT: CPT

## 2024-10-04 PROCEDURE — 6370000000 HC RX 637 (ALT 250 FOR IP): Performed by: PHYSICIAN ASSISTANT

## 2024-10-04 RX ORDER — DILTIAZEM HYDROCHLORIDE 30 MG/1
120 TABLET, FILM COATED ORAL ONCE
Status: COMPLETED | OUTPATIENT
Start: 2024-10-04 | End: 2024-10-04

## 2024-10-04 RX ORDER — CEPHALEXIN 500 MG/1
500 CAPSULE ORAL 4 TIMES DAILY
Qty: 28 CAPSULE | Refills: 0 | Status: SHIPPED | OUTPATIENT
Start: 2024-10-04 | End: 2024-10-11

## 2024-10-04 RX ADMIN — CEPHALEXIN 500 MG: 250 CAPSULE ORAL at 14:18

## 2024-10-04 RX ADMIN — DILTIAZEM HYDROCHLORIDE 120 MG: 30 TABLET, FILM COATED ORAL at 18:19

## 2024-10-04 NOTE — ED PROVIDER NOTES
[facility] EMERGENCY DEPARTMENT  EMERGENCY DEPARTMENT ENCOUNTER        Pt Name: Ness Prieto  MRN: [id number]  Birthdate 1940  Date of evaluation: 10/4/2024  Provider: Sukumar Nails PA-C  PCP: Parish Strickland MD  Note Started: 11:08 AM EDT 10/4/24      FAUSTINO. I have evaluated this patient.        CHIEF COMPLAINT       Chief Complaint   Patient presents with    Shortness of Breath     Pt presents to the ER with the complaint of staff reporting possible SOB or chest pain. Pt has a hx of dementia and has been rubbing her chest. NP on the scene stated he felt the patient was indicating SOB or she was having CP. Pt is not appear to be in distress and is saturating well. Pt is in A-fib and has a hx of such.        HISTORY OF PRESENT ILLNESS: 1 or more Elements     History From: patient    Ness Prieto is a 83 y.o. female with past medical history of A-fib, dementia who presents with possible chest pain.  Patient severe dementia lives at nursing home, they were concerned at the nursing home that patient was rubbing her chest.  Patient denied any pain or complaints though.  Patient has history of chronic A-fib they reported her heart rate was elevated and they were concerned for RVR as well.  Patient's POA/granddaughter at bedside also concern for UTI because she has a history of UTI, was on antibiotics last month and once for recheck.  No fever, falls, mental status change, vomiting, abdominal pain.    Nursing Notes were all reviewed and agreed with or any disagreements were addressed in the HPI.    REVIEW OF SYSTEMS :      Review of Systems   All other systems reviewed and are negative.      Positives and Pertinent negatives as per HPI.       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      DISCHARGE MEDICATIONS:  Discharge Medication List as of 10/4/2024  6:47 PM        START taking these medications    Details   cephALEXin (KEFLEX) 500 MG capsule Take 1 capsule by mouth 4 times daily for 7 days, Disp-28 capsule, R-0Print             DISCONTINUED MEDICATIONS:  Discharge Medication List as of 10/4/2024  6:47 PM                 (Please note that portions of this note were completed with a voice recognition program.  Efforts were made to edit the dictations but occasionally words are mis-transcribed.)    Sukumar Nails PA-C (electronically signed)            Sukumar Nails PA-C  10/04/24 1943

## 2024-10-05 LAB
EKG DIAGNOSIS: NORMAL
EKG Q-T INTERVAL: 390 MS
EKG QRS DURATION: 74 MS
EKG QTC CALCULATION (BAZETT): 412 MS
EKG R AXIS: 45 DEGREES
EKG T AXIS: 24 DEGREES
EKG VENTRICULAR RATE: 67 BPM

## 2024-10-05 PROCEDURE — 93010 ELECTROCARDIOGRAM REPORT: CPT | Performed by: INTERNAL MEDICINE

## 2024-10-06 LAB
BACTERIA UR CULT: ABNORMAL
BACTERIA UR CULT: ABNORMAL
ORGANISM: ABNORMAL
ORGANISM: ABNORMAL

## 2024-10-07 LAB
BACTERIA UR CULT: ABNORMAL
BACTERIA UR CULT: ABNORMAL
ORGANISM: ABNORMAL
ORGANISM: ABNORMAL
PATH INTERP BLD-IMP: NORMAL

## 2025-03-02 ENCOUNTER — HOSPITAL ENCOUNTER (INPATIENT)
Age: 85
LOS: 6 days | Discharge: HOME | DRG: 045 | End: 2025-03-08
Attending: EMERGENCY MEDICINE | Admitting: INTERNAL MEDICINE
Payer: MEDICAID

## 2025-03-02 ENCOUNTER — APPOINTMENT (OUTPATIENT)
Dept: GENERAL RADIOLOGY | Age: 85
DRG: 045 | End: 2025-03-02
Payer: MEDICAID

## 2025-03-02 DIAGNOSIS — N30.00 ACUTE CYSTITIS WITHOUT HEMATURIA: ICD-10-CM

## 2025-03-02 DIAGNOSIS — R00.1 JUNCTIONAL BRADYCARDIA: Primary | ICD-10-CM

## 2025-03-02 PROBLEM — R40.20 LOSS OF CONSCIOUSNESS (HCC): Status: ACTIVE | Noted: 2025-03-02

## 2025-03-02 LAB
ALBUMIN SERPL-MCNC: 3.5 G/DL (ref 3.4–5)
ALBUMIN/GLOB SERPL: 1.1 {RATIO} (ref 1.1–2.2)
ALP SERPL-CCNC: 89 U/L (ref 40–129)
ALT SERPL-CCNC: 16 U/L (ref 10–40)
ALT SERPL-CCNC: ABNORMAL U/L (ref 10–40)
ANION GAP SERPL CALCULATED.3IONS-SCNC: 15 MMOL/L (ref 3–16)
AST SERPL-CCNC: 36 U/L (ref 15–37)
BACTERIA URNS QL MICRO: ABNORMAL /HPF
BASOPHILS # BLD: 0.1 K/UL (ref 0–0.2)
BASOPHILS NFR BLD: 0.5 %
BILIRUB SERPL-MCNC: 0.6 MG/DL (ref 0–1)
BILIRUB UR QL STRIP.AUTO: ABNORMAL
BUN SERPL-MCNC: 24 MG/DL (ref 7–20)
CALCIUM SERPL-MCNC: 8.7 MG/DL (ref 8.3–10.6)
CHLORIDE SERPL-SCNC: 108 MMOL/L (ref 99–110)
CLARITY UR: ABNORMAL
CO2 SERPL-SCNC: 18 MMOL/L (ref 21–32)
COLOR UR: ABNORMAL
CREAT SERPL-MCNC: 0.8 MG/DL (ref 0.6–1.2)
DEPRECATED RDW RBC AUTO: 15.1 % (ref 12.4–15.4)
EOSINOPHIL # BLD: 0.1 K/UL (ref 0–0.6)
EOSINOPHIL NFR BLD: 1.2 %
EPI CELLS #/AREA URNS AUTO: 128 /HPF (ref 0–5)
GFR SERPLBLD CREATININE-BSD FMLA CKD-EPI: 73 ML/MIN/{1.73_M2}
GLUCOSE SERPL-MCNC: 99 MG/DL (ref 70–99)
GLUCOSE UR STRIP.AUTO-MCNC: NEGATIVE MG/DL
HCT VFR BLD AUTO: 32.1 % (ref 36–48)
HGB BLD-MCNC: 10.3 G/DL (ref 12–16)
HGB UR QL STRIP.AUTO: ABNORMAL
HYALINE CASTS #/AREA URNS AUTO: 129 /LPF (ref 0–8)
KETONES UR STRIP.AUTO-MCNC: ABNORMAL MG/DL
LEUKOCYTE ESTERASE UR QL STRIP.AUTO: ABNORMAL
LYMPHOCYTES # BLD: 1.6 K/UL (ref 1–5.1)
LYMPHOCYTES NFR BLD: 14.5 %
MAGNESIUM SERPL-MCNC: 1.93 MG/DL (ref 1.8–2.4)
MCH RBC QN AUTO: 30 PG (ref 26–34)
MCHC RBC AUTO-ENTMCNC: 32.1 G/DL (ref 31–36)
MCV RBC AUTO: 93.5 FL (ref 80–100)
MONOCYTES # BLD: 0.9 K/UL (ref 0–1.3)
MONOCYTES NFR BLD: 8 %
NEUTROPHILS # BLD: 8.2 K/UL (ref 1.7–7.7)
NEUTROPHILS NFR BLD: 75.8 %
NITRITE UR QL STRIP.AUTO: NEGATIVE
PH UR STRIP.AUTO: 7.5 [PH] (ref 5–8)
PLATELET # BLD AUTO: 305 K/UL (ref 135–450)
PMV BLD AUTO: 8.5 FL (ref 5–10.5)
POTASSIUM SERPL-SCNC: 4.9 MMOL/L (ref 3.5–5.1)
POTASSIUM SERPL-SCNC: ABNORMAL MMOL/L (ref 3.5–5.1)
PROT SERPL-MCNC: 6.7 G/DL (ref 6.4–8.2)
PROT UR STRIP.AUTO-MCNC: 100 MG/DL
RBC # BLD AUTO: 3.43 M/UL (ref 4–5.2)
RBC CLUMPS #/AREA URNS AUTO: 12 /HPF (ref 0–4)
SODIUM SERPL-SCNC: 141 MMOL/L (ref 136–145)
SP GR UR STRIP.AUTO: 1.02 (ref 1–1.03)
TROPONIN, HIGH SENSITIVITY: 19 NG/L (ref 0–14)
TROPONIN, HIGH SENSITIVITY: 20 NG/L (ref 0–14)
TSH SERPL DL<=0.005 MIU/L-ACNC: 3.67 UIU/ML (ref 0.27–4.2)
UA COMPLETE W REFLEX CULTURE PNL UR: YES
UA DIPSTICK W REFLEX MICRO PNL UR: YES
URN SPEC COLLECT METH UR: ABNORMAL
UROBILINOGEN UR STRIP-ACNC: 1 E.U./DL
WBC # BLD AUTO: 10.8 K/UL (ref 4–11)
WBC #/AREA URNS AUTO: 110 /HPF (ref 0–5)

## 2025-03-02 PROCEDURE — 84443 ASSAY THYROID STIM HORMONE: CPT

## 2025-03-02 PROCEDURE — 87077 CULTURE AEROBIC IDENTIFY: CPT

## 2025-03-02 PROCEDURE — 6360000002 HC RX W HCPCS: Performed by: EMERGENCY MEDICINE

## 2025-03-02 PROCEDURE — 96374 THER/PROPH/DIAG INJ IV PUSH: CPT

## 2025-03-02 PROCEDURE — 71045 X-RAY EXAM CHEST 1 VIEW: CPT

## 2025-03-02 PROCEDURE — 6370000000 HC RX 637 (ALT 250 FOR IP): Performed by: NURSE PRACTITIONER

## 2025-03-02 PROCEDURE — 84484 ASSAY OF TROPONIN QUANT: CPT

## 2025-03-02 PROCEDURE — 80053 COMPREHEN METABOLIC PANEL: CPT

## 2025-03-02 PROCEDURE — 81001 URINALYSIS AUTO W/SCOPE: CPT

## 2025-03-02 PROCEDURE — 83735 ASSAY OF MAGNESIUM: CPT

## 2025-03-02 PROCEDURE — 36415 COLL VENOUS BLD VENIPUNCTURE: CPT

## 2025-03-02 PROCEDURE — 87086 URINE CULTURE/COLONY COUNT: CPT

## 2025-03-02 PROCEDURE — 99285 EMERGENCY DEPT VISIT HI MDM: CPT

## 2025-03-02 PROCEDURE — 84460 ALANINE AMINO (ALT) (SGPT): CPT

## 2025-03-02 PROCEDURE — 2580000003 HC RX 258: Performed by: EMERGENCY MEDICINE

## 2025-03-02 PROCEDURE — 85025 COMPLETE CBC W/AUTO DIFF WBC: CPT

## 2025-03-02 PROCEDURE — 84132 ASSAY OF SERUM POTASSIUM: CPT

## 2025-03-02 PROCEDURE — 93005 ELECTROCARDIOGRAM TRACING: CPT | Performed by: EMERGENCY MEDICINE

## 2025-03-02 PROCEDURE — 6370000000 HC RX 637 (ALT 250 FOR IP): Performed by: EMERGENCY MEDICINE

## 2025-03-02 PROCEDURE — 87186 SC STD MICRODIL/AGAR DIL: CPT

## 2025-03-02 PROCEDURE — 1200000000 HC SEMI PRIVATE

## 2025-03-02 RX ORDER — M-VIT,TX,IRON,MINS/CALC/FOLIC 27MG-0.4MG
1 TABLET ORAL DAILY
COMMUNITY

## 2025-03-02 RX ORDER — POTASSIUM CHLORIDE 1500 MG/1
40 TABLET, EXTENDED RELEASE ORAL PRN
Status: DISCONTINUED | OUTPATIENT
Start: 2025-03-02 | End: 2025-03-08 | Stop reason: HOSPADM

## 2025-03-02 RX ORDER — SODIUM CHLORIDE 9 MG/ML
INJECTION, SOLUTION INTRAVENOUS PRN
Status: DISCONTINUED | OUTPATIENT
Start: 2025-03-02 | End: 2025-03-08 | Stop reason: HOSPADM

## 2025-03-02 RX ORDER — ENOXAPARIN SODIUM 100 MG/ML
40 INJECTION SUBCUTANEOUS DAILY
Status: DISCONTINUED | OUTPATIENT
Start: 2025-03-03 | End: 2025-03-08 | Stop reason: HOSPADM

## 2025-03-02 RX ORDER — DIVALPROEX SODIUM 125 MG/1
125 CAPSULE, COATED PELLETS ORAL 2 TIMES DAILY
Status: DISCONTINUED | OUTPATIENT
Start: 2025-03-02 | End: 2025-03-03

## 2025-03-02 RX ORDER — LACTOSE-REDUCED FOOD
240 LIQUID (ML) ORAL 3 TIMES DAILY
COMMUNITY

## 2025-03-02 RX ORDER — ACETAMINOPHEN 325 MG/1
650 TABLET ORAL EVERY 6 HOURS PRN
Status: DISCONTINUED | OUTPATIENT
Start: 2025-03-02 | End: 2025-03-08 | Stop reason: HOSPADM

## 2025-03-02 RX ORDER — ATORVASTATIN CALCIUM 40 MG/1
40 TABLET, FILM COATED ORAL NIGHTLY
Status: DISCONTINUED | OUTPATIENT
Start: 2025-03-02 | End: 2025-03-03

## 2025-03-02 RX ORDER — SODIUM CHLORIDE 0.9 % (FLUSH) 0.9 %
5-40 SYRINGE (ML) INJECTION EVERY 12 HOURS SCHEDULED
Status: DISCONTINUED | OUTPATIENT
Start: 2025-03-02 | End: 2025-03-08 | Stop reason: HOSPADM

## 2025-03-02 RX ORDER — ASPIRIN 81 MG/1
81 TABLET, CHEWABLE ORAL DAILY
Status: DISCONTINUED | OUTPATIENT
Start: 2025-03-03 | End: 2025-03-08 | Stop reason: HOSPADM

## 2025-03-02 RX ORDER — ONDANSETRON 4 MG/1
4 TABLET, ORALLY DISINTEGRATING ORAL EVERY 8 HOURS PRN
Status: DISCONTINUED | OUTPATIENT
Start: 2025-03-02 | End: 2025-03-08 | Stop reason: HOSPADM

## 2025-03-02 RX ORDER — ONDANSETRON 2 MG/ML
4 INJECTION INTRAMUSCULAR; INTRAVENOUS EVERY 6 HOURS PRN
Status: DISCONTINUED | OUTPATIENT
Start: 2025-03-02 | End: 2025-03-08 | Stop reason: HOSPADM

## 2025-03-02 RX ORDER — DIVALPROEX SODIUM 125 MG/1
125 TABLET, DELAYED RELEASE ORAL 2 TIMES DAILY
Status: ON HOLD | COMMUNITY
Start: 2025-01-31 | End: 2025-03-08 | Stop reason: HOSPADM

## 2025-03-02 RX ORDER — SODIUM CHLORIDE 0.9 % (FLUSH) 0.9 %
5-40 SYRINGE (ML) INJECTION PRN
Status: DISCONTINUED | OUTPATIENT
Start: 2025-03-02 | End: 2025-03-08 | Stop reason: HOSPADM

## 2025-03-02 RX ORDER — ACETAMINOPHEN 325 MG/1
650 TABLET ORAL EVERY 6 HOURS PRN
Status: DISCONTINUED | OUTPATIENT
Start: 2025-03-02 | End: 2025-03-02 | Stop reason: ALTCHOICE

## 2025-03-02 RX ORDER — POTASSIUM CHLORIDE 7.45 MG/ML
10 INJECTION INTRAVENOUS PRN
Status: DISCONTINUED | OUTPATIENT
Start: 2025-03-02 | End: 2025-03-08 | Stop reason: HOSPADM

## 2025-03-02 RX ORDER — ACETAMINOPHEN 650 MG/1
650 SUPPOSITORY RECTAL EVERY 6 HOURS PRN
Status: DISCONTINUED | OUTPATIENT
Start: 2025-03-02 | End: 2025-03-08 | Stop reason: HOSPADM

## 2025-03-02 RX ORDER — DIVALPROEX SODIUM 125 MG/1
125 CAPSULE, COATED PELLETS ORAL EVERY 12 HOURS
Status: ON HOLD | COMMUNITY
End: 2025-03-02

## 2025-03-02 RX ORDER — GRANULES FOR ORAL 3 G/1
3 POWDER ORAL SEE ADMIN INSTRUCTIONS
COMMUNITY

## 2025-03-02 RX ORDER — SENNA AND DOCUSATE SODIUM 50; 8.6 MG/1; MG/1
1 TABLET, FILM COATED ORAL 2 TIMES DAILY
COMMUNITY

## 2025-03-02 RX ORDER — ASPIRIN 81 MG/1
324 TABLET, CHEWABLE ORAL ONCE
Status: COMPLETED | OUTPATIENT
Start: 2025-03-02 | End: 2025-03-02

## 2025-03-02 RX ORDER — POLYETHYLENE GLYCOL 3350 17 G/17G
17 POWDER, FOR SOLUTION ORAL DAILY PRN
Status: DISCONTINUED | OUTPATIENT
Start: 2025-03-02 | End: 2025-03-08 | Stop reason: HOSPADM

## 2025-03-02 RX ORDER — OLANZAPINE 5 MG/1
2.5 TABLET ORAL NIGHTLY
Status: DISCONTINUED | OUTPATIENT
Start: 2025-03-02 | End: 2025-03-06

## 2025-03-02 RX ORDER — MAGNESIUM SULFATE IN WATER 40 MG/ML
2000 INJECTION, SOLUTION INTRAVENOUS PRN
Status: DISCONTINUED | OUTPATIENT
Start: 2025-03-02 | End: 2025-03-08 | Stop reason: HOSPADM

## 2025-03-02 RX ORDER — DILTIAZEM HYDROCHLORIDE 240 MG/1
240 CAPSULE, EXTENDED RELEASE ORAL DAILY
Status: ON HOLD | COMMUNITY
Start: 2025-02-03 | End: 2025-03-08 | Stop reason: HOSPADM

## 2025-03-02 RX ORDER — M-VIT,TX,IRON,MINS/CALC/FOLIC 27MG-0.4MG
1 TABLET ORAL DAILY
Status: DISCONTINUED | OUTPATIENT
Start: 2025-03-03 | End: 2025-03-08 | Stop reason: HOSPADM

## 2025-03-02 RX ORDER — ESTRADIOL 0.1 MG/G
1 CREAM VAGINAL NIGHTLY
Status: DISCONTINUED | OUTPATIENT
Start: 2025-03-02 | End: 2025-03-03

## 2025-03-02 RX ADMIN — MELATONIN TAB 3 MG 6 MG: 3 TAB at 23:05

## 2025-03-02 RX ADMIN — MEROPENEM 1000 MG: 1 INJECTION INTRAVENOUS at 21:25

## 2025-03-02 RX ADMIN — ATORVASTATIN CALCIUM 40 MG: 40 TABLET, FILM COATED ORAL at 23:05

## 2025-03-02 RX ADMIN — ASPIRIN 324 MG: 81 TABLET, CHEWABLE ORAL at 20:33

## 2025-03-02 RX ADMIN — DIVALPROEX SODIUM 125 MG: 125 CAPSULE ORAL at 23:05

## 2025-03-02 RX ADMIN — OLANZAPINE 2.5 MG: 5 TABLET, FILM COATED ORAL at 23:05

## 2025-03-02 ASSESSMENT — LIFESTYLE VARIABLES
HOW OFTEN DO YOU HAVE A DRINK CONTAINING ALCOHOL: NEVER
HOW MANY STANDARD DRINKS CONTAINING ALCOHOL DO YOU HAVE ON A TYPICAL DAY: PATIENT DOES NOT DRINK

## 2025-03-02 NOTE — ED NOTES
Patient Name: Ness Prieto  : 1940 84 y.o.  MRN: [id number]  ED Room #: ED-0021/     Chief complaint:   Chief Complaint   Patient presents with    Bradycardia     Brought in by EMS from [facility] for bradycardia and hypoxia. Per facility patient recently started on amlodipine 10 mg on .  Patient bilateral above the knee amputee. History of dementia (mostly non-verbal), CAD, and CVA.      Hospital Problem/Diagnosis:       O2 Flow Rate:O2 Device: None (Room air)   (if applicable)  Cardiac Rhythm:   (if applicable)  Active LDA's:   Peripheral IV 25 Distal;Right;Anterior Wrist (Active)            How does patient ambulate? Wheelchair    2. How does patient take pills? Whole with Water    3. Is patient alert? Alert    4. Is patient oriented? Follows Commands    5.   Patient arrived from:  nursing home  Facility Name: [facility] -     6. If patient is disoriented or from a Skill Nursing Facility has family been notified of admission? Yes    7. Patient belongings? Belongings: Gown from [facility].     Disposition of belongings? Kept with Patient     8. Any specific patient or family belongings/needs/dynamics?   a. N/A    9. Miscellaneous comments/pending orders?  a. All ED orders complete.       If there are any additional questions please reach out to the Emergency Department.

## 2025-03-03 ENCOUNTER — APPOINTMENT (OUTPATIENT)
Dept: CT IMAGING | Age: 85
DRG: 045 | End: 2025-03-03
Payer: MEDICAID

## 2025-03-03 ENCOUNTER — APPOINTMENT (OUTPATIENT)
Age: 85
DRG: 045 | End: 2025-03-03
Payer: MEDICAID

## 2025-03-03 PROBLEM — I48.21 PERMANENT ATRIAL FIBRILLATION (HCC): Status: ACTIVE | Noted: 2025-03-03

## 2025-03-03 PROBLEM — I73.9 PERIPHERAL VASCULAR DISEASE: Status: ACTIVE | Noted: 2025-03-03

## 2025-03-03 PROBLEM — Z86.73 HISTORY OF STROKE: Status: ACTIVE | Noted: 2025-03-03

## 2025-03-03 PROBLEM — B99.9 INFECTION REQUIRING CONTACT ISOLATION PRECAUTIONS: Status: ACTIVE | Noted: 2025-03-03

## 2025-03-03 PROBLEM — R41.82 ALTERED MENTAL STATUS: Status: ACTIVE | Noted: 2025-03-03

## 2025-03-03 PROBLEM — Z86.19 HISTORY OF ESBL KLEBSIELLA PNEUMONIAE INFECTION: Status: ACTIVE | Noted: 2025-03-03

## 2025-03-03 PROBLEM — R00.1 JUNCTIONAL BRADYCARDIA: Status: ACTIVE | Noted: 2025-03-03

## 2025-03-03 PROBLEM — E78.2 MIXED HYPERLIPIDEMIA: Status: ACTIVE | Noted: 2025-03-03

## 2025-03-03 PROBLEM — A41.9 SEPSIS (HCC): Status: ACTIVE | Noted: 2025-03-03

## 2025-03-03 LAB
ANION GAP SERPL CALCULATED.3IONS-SCNC: 10 MMOL/L (ref 3–16)
BASOPHILS # BLD: 0 K/UL (ref 0–0.2)
BASOPHILS NFR BLD: 0.4 %
BUN SERPL-MCNC: 23 MG/DL (ref 7–20)
CALCIUM SERPL-MCNC: 8.6 MG/DL (ref 8.3–10.6)
CHLORIDE SERPL-SCNC: 108 MMOL/L (ref 99–110)
CO2 SERPL-SCNC: 23 MMOL/L (ref 21–32)
CREAT SERPL-MCNC: 0.7 MG/DL (ref 0.6–1.2)
DEPRECATED RDW RBC AUTO: 14.6 % (ref 12.4–15.4)
ECHO AO ASC DIAM: 3.4 CM
ECHO AO ASCENDING AORTA INDEX: 2.3 CM/M2
ECHO AO ROOT DIAM: 3 CM
ECHO AO ROOT INDEX: 2.03 CM/M2
ECHO AV AREA PEAK VELOCITY: 1.4 CM2
ECHO AV AREA VTI: 1.2 CM2
ECHO AV AREA/BSA PEAK VELOCITY: 0.9 CM2/M2
ECHO AV AREA/BSA VTI: 0.8 CM2/M2
ECHO AV MEAN GRADIENT: 7 MMHG
ECHO AV MEAN VELOCITY: 1.3 M/S
ECHO AV PEAK GRADIENT: 13 MMHG
ECHO AV PEAK VELOCITY: 1.8 M/S
ECHO AV VELOCITY RATIO: 0.56
ECHO AV VTI: 39.3 CM
ECHO BSA: 1.44 M2
ECHO EST RA PRESSURE: 3 MMHG
ECHO LA AREA 2C: 25.8 CM2
ECHO LA AREA 4C: 24.3 CM2
ECHO LA DIAMETER INDEX: 2.77 CM/M2
ECHO LA DIAMETER: 4.1 CM
ECHO LA MAJOR AXIS: 7 CM
ECHO LA MINOR AXIS: 6.5 CM
ECHO LA TO AORTIC ROOT RATIO: 1.37
ECHO LA VOL BP: 77 ML (ref 22–52)
ECHO LA VOL MOD A2C: 80 ML (ref 22–52)
ECHO LA VOL MOD A4C: 70 ML (ref 22–52)
ECHO LA VOL/BSA BIPLANE: 52 ML/M2 (ref 16–34)
ECHO LA VOLUME INDEX MOD A2C: 54 ML/M2 (ref 16–34)
ECHO LA VOLUME INDEX MOD A4C: 47 ML/M2 (ref 16–34)
ECHO LV E' LATERAL VELOCITY: 7.54 CM/S
ECHO LV E' SEPTAL VELOCITY: 6.74 CM/S
ECHO LV EDV A2C: 58 ML
ECHO LV EDV A4C: 54 ML
ECHO LV EDV INDEX A4C: 36 ML/M2
ECHO LV EDV NDEX A2C: 39 ML/M2
ECHO LV EF PHYSICIAN: 63 %
ECHO LV EJECTION FRACTION A2C: 43 %
ECHO LV EJECTION FRACTION A4C: 59 %
ECHO LV ESV A2C: 33 ML
ECHO LV ESV A4C: 22 ML
ECHO LV ESV INDEX A2C: 22 ML/M2
ECHO LV ESV INDEX A4C: 15 ML/M2
ECHO LV FRACTIONAL SHORTENING: 35 % (ref 28–44)
ECHO LV INTERNAL DIMENSION DIASTOLE INDEX: 1.15 CM/M2
ECHO LV INTERNAL DIMENSION DIASTOLIC: 1.7 CM (ref 3.9–5.3)
ECHO LV INTERNAL DIMENSION SYSTOLIC INDEX: 0.74 CM/M2
ECHO LV INTERNAL DIMENSION SYSTOLIC: 1.1 CM
ECHO LV IVSD: 1.4 CM (ref 0.6–0.9)
ECHO LV MASS 2D: 72.3 G (ref 67–162)
ECHO LV MASS INDEX 2D: 48.9 G/M2 (ref 43–95)
ECHO LV POSTERIOR WALL DIASTOLIC: 1.4 CM (ref 0.6–0.9)
ECHO LV RELATIVE WALL THICKNESS RATIO: 1.65
ECHO LVOT AREA: 2.5 CM2
ECHO LVOT AV VTI INDEX: 0.47
ECHO LVOT DIAM: 1.8 CM
ECHO LVOT MEAN GRADIENT: 2 MMHG
ECHO LVOT PEAK GRADIENT: 4 MMHG
ECHO LVOT PEAK VELOCITY: 1 M/S
ECHO LVOT STROKE VOLUME INDEX: 31.4 ML/M2
ECHO LVOT SV: 46.5 ML
ECHO LVOT VTI: 18.3 CM
ECHO MV A VELOCITY: 0.4 M/S
ECHO MV AREA VTI: 1.2 CM2
ECHO MV E VELOCITY: 1.53 M/S
ECHO MV E/A RATIO: 3.83
ECHO MV E/E' LATERAL: 20.29
ECHO MV E/E' RATIO (AVERAGED): 21.5
ECHO MV E/E' SEPTAL: 22.7
ECHO MV LVOT VTI INDEX: 2.04
ECHO MV MAX VELOCITY: 1.6 M/S
ECHO MV MEAN GRADIENT: 3 MMHG
ECHO MV MEAN VELOCITY: 0.8 M/S
ECHO MV PEAK GRADIENT: 10 MMHG
ECHO MV REGURGITANT PEAK GRADIENT: 130 MMHG
ECHO MV REGURGITANT PEAK VELOCITY: 5.7 M/S
ECHO MV VTI: 37.3 CM
ECHO PV MAX VELOCITY: 0.8 M/S
ECHO PV PEAK GRADIENT: 3 MMHG
ECHO RA AREA 4C: 14.3 CM2
ECHO RA END SYSTOLIC VOLUME APICAL 4 CHAMBER INDEX BSA: 22 ML/M2
ECHO RA VOLUME: 32 ML
ECHO RIGHT VENTRICULAR SYSTOLIC PRESSURE (RVSP): 32 MMHG
ECHO RV BASAL DIMENSION: 3.4 CM
ECHO RV FREE WALL PEAK S': 13.4 CM/S
ECHO RV MID DIMENSION: 1.8 CM
ECHO RV TAPSE: 2.1 CM (ref 1.7–?)
ECHO TV REGURGITANT MAX VELOCITY: 2.69 M/S
ECHO TV REGURGITANT PEAK GRADIENT: 29 MMHG
EKG DIAGNOSIS: NORMAL
EKG Q-T INTERVAL: 472 MS
EKG QRS DURATION: 68 MS
EKG QTC CALCULATION (BAZETT): 403 MS
EKG R AXIS: 52 DEGREES
EKG T AXIS: 46 DEGREES
EKG VENTRICULAR RATE: 44 BPM
EOSINOPHIL # BLD: 0.1 K/UL (ref 0–0.6)
EOSINOPHIL NFR BLD: 1.9 %
GFR SERPLBLD CREATININE-BSD FMLA CKD-EPI: 85 ML/MIN/{1.73_M2}
GLUCOSE BLD-MCNC: 100 MG/DL (ref 70–99)
GLUCOSE BLD-MCNC: 135 MG/DL (ref 70–99)
GLUCOSE BLD-MCNC: 139 MG/DL (ref 70–99)
GLUCOSE BLD-MCNC: 60 MG/DL (ref 70–99)
GLUCOSE BLD-MCNC: 62 MG/DL (ref 70–99)
GLUCOSE BLD-MCNC: 65 MG/DL (ref 70–99)
GLUCOSE SERPL-MCNC: 78 MG/DL (ref 70–99)
HCT VFR BLD AUTO: 29.1 % (ref 36–48)
HGB BLD-MCNC: 9.7 G/DL (ref 12–16)
LYMPHOCYTES # BLD: 1.7 K/UL (ref 1–5.1)
LYMPHOCYTES NFR BLD: 23.8 %
MCH RBC QN AUTO: 30 PG (ref 26–34)
MCHC RBC AUTO-ENTMCNC: 33.3 G/DL (ref 31–36)
MCV RBC AUTO: 90 FL (ref 80–100)
MONOCYTES # BLD: 0.8 K/UL (ref 0–1.3)
MONOCYTES NFR BLD: 11.3 %
NEUTROPHILS # BLD: 4.5 K/UL (ref 1.7–7.7)
NEUTROPHILS NFR BLD: 62.6 %
PERFORMED ON: ABNORMAL
PLATELET # BLD AUTO: 248 K/UL (ref 135–450)
PMV BLD AUTO: 8.3 FL (ref 5–10.5)
POTASSIUM SERPL-SCNC: 3.9 MMOL/L (ref 3.5–5.1)
RBC # BLD AUTO: 3.23 M/UL (ref 4–5.2)
SODIUM SERPL-SCNC: 141 MMOL/L (ref 136–145)
TROPONIN, HIGH SENSITIVITY: 16 NG/L (ref 0–14)
WBC # BLD AUTO: 7.2 K/UL (ref 4–11)

## 2025-03-03 PROCEDURE — 93306 TTE W/DOPPLER COMPLETE: CPT

## 2025-03-03 PROCEDURE — 87040 BLOOD CULTURE FOR BACTERIA: CPT

## 2025-03-03 PROCEDURE — 92610 EVALUATE SWALLOWING FUNCTION: CPT

## 2025-03-03 PROCEDURE — 6360000002 HC RX W HCPCS: Performed by: NURSE PRACTITIONER

## 2025-03-03 PROCEDURE — 6360000002 HC RX W HCPCS: Performed by: INTERNAL MEDICINE

## 2025-03-03 PROCEDURE — 2580000003 HC RX 258: Performed by: INTERNAL MEDICINE

## 2025-03-03 PROCEDURE — 93306 TTE W/DOPPLER COMPLETE: CPT | Performed by: INTERNAL MEDICINE

## 2025-03-03 PROCEDURE — 99255 IP/OBS CONSLTJ NEW/EST HI 80: CPT | Performed by: INTERNAL MEDICINE

## 2025-03-03 PROCEDURE — 1200000000 HC SEMI PRIVATE

## 2025-03-03 PROCEDURE — 36415 COLL VENOUS BLD VENIPUNCTURE: CPT

## 2025-03-03 PROCEDURE — 80048 BASIC METABOLIC PNL TOTAL CA: CPT

## 2025-03-03 PROCEDURE — 93010 ELECTROCARDIOGRAM REPORT: CPT | Performed by: INTERNAL MEDICINE

## 2025-03-03 PROCEDURE — 2580000003 HC RX 258: Performed by: NURSE PRACTITIONER

## 2025-03-03 PROCEDURE — G0545 PR INHERENT VISIT TO INPT: HCPCS | Performed by: INTERNAL MEDICINE

## 2025-03-03 PROCEDURE — 85025 COMPLETE CBC W/AUTO DIFF WBC: CPT

## 2025-03-03 PROCEDURE — 2500000003 HC RX 250 WO HCPCS: Performed by: NURSE PRACTITIONER

## 2025-03-03 PROCEDURE — 6370000000 HC RX 637 (ALT 250 FOR IP): Performed by: NURSE PRACTITIONER

## 2025-03-03 PROCEDURE — 70450 CT HEAD/BRAIN W/O DYE: CPT

## 2025-03-03 RX ORDER — DIVALPROEX SODIUM 125 MG/1
125 CAPSULE, COATED PELLETS ORAL 2 TIMES DAILY
Status: DISCONTINUED | OUTPATIENT
Start: 2025-03-04 | End: 2025-03-04

## 2025-03-03 RX ORDER — HYDRALAZINE HYDROCHLORIDE 20 MG/ML
5 INJECTION INTRAMUSCULAR; INTRAVENOUS EVERY 6 HOURS PRN
Status: DISCONTINUED | OUTPATIENT
Start: 2025-03-03 | End: 2025-03-08 | Stop reason: HOSPADM

## 2025-03-03 RX ORDER — GLUCAGON 1 MG/ML
1 KIT INJECTION PRN
Status: DISCONTINUED | OUTPATIENT
Start: 2025-03-03 | End: 2025-03-08 | Stop reason: HOSPADM

## 2025-03-03 RX ORDER — METOPROLOL TARTRATE 1 MG/ML
5 INJECTION, SOLUTION INTRAVENOUS EVERY 8 HOURS PRN
Status: DISCONTINUED | OUTPATIENT
Start: 2025-03-03 | End: 2025-03-08 | Stop reason: HOSPADM

## 2025-03-03 RX ORDER — ESTRADIOL 0.1 MG/G
1 CREAM VAGINAL DAILY
Status: DISCONTINUED | OUTPATIENT
Start: 2025-03-04 | End: 2025-03-08 | Stop reason: HOSPADM

## 2025-03-03 RX ORDER — DEXTROSE MONOHYDRATE 100 MG/ML
INJECTION, SOLUTION INTRAVENOUS CONTINUOUS PRN
Status: DISCONTINUED | OUTPATIENT
Start: 2025-03-03 | End: 2025-03-08 | Stop reason: HOSPADM

## 2025-03-03 RX ORDER — DEXTROSE, SODIUM CHLORIDE, SODIUM LACTATE, POTASSIUM CHLORIDE, AND CALCIUM CHLORIDE 5; .6; .31; .03; .02 G/100ML; G/100ML; G/100ML; G/100ML; G/100ML
INJECTION, SOLUTION INTRAVENOUS CONTINUOUS
Status: DISCONTINUED | OUTPATIENT
Start: 2025-03-03 | End: 2025-03-08 | Stop reason: HOSPADM

## 2025-03-03 RX ORDER — ATORVASTATIN CALCIUM 40 MG/1
40 TABLET, FILM COATED ORAL
Status: DISCONTINUED | OUTPATIENT
Start: 2025-03-04 | End: 2025-03-08 | Stop reason: HOSPADM

## 2025-03-03 RX ADMIN — DIVALPROEX SODIUM 125 MG: 125 CAPSULE ORAL at 09:15

## 2025-03-03 RX ADMIN — Medication 10 ML: at 09:23

## 2025-03-03 RX ADMIN — MEROPENEM 1000 MG: 1 INJECTION INTRAVENOUS at 06:25

## 2025-03-03 RX ADMIN — SODIUM CHLORIDE, SODIUM LACTATE, POTASSIUM CHLORIDE, CALCIUM CHLORIDE AND DEXTROSE MONOHYDRATE: 5; 600; 310; 30; 20 INJECTION, SOLUTION INTRAVENOUS at 22:27

## 2025-03-03 RX ADMIN — SODIUM CHLORIDE, SODIUM LACTATE, POTASSIUM CHLORIDE, CALCIUM CHLORIDE AND DEXTROSE MONOHYDRATE: 5; 600; 310; 30; 20 INJECTION, SOLUTION INTRAVENOUS at 11:54

## 2025-03-03 RX ADMIN — DEXTROSE 250 ML: 10 SOLUTION INTRAVENOUS at 11:34

## 2025-03-03 RX ADMIN — DEXTROSE 125 ML: 10 SOLUTION INTRAVENOUS at 20:22

## 2025-03-03 RX ADMIN — MEROPENEM 1000 MG: 1 INJECTION INTRAVENOUS at 17:13

## 2025-03-03 RX ADMIN — ENOXAPARIN SODIUM 40 MG: 100 INJECTION SUBCUTANEOUS at 09:15

## 2025-03-03 ASSESSMENT — PAIN SCALES - GENERAL
PAINLEVEL_OUTOF10: 0

## 2025-03-03 NOTE — PROGRESS NOTES
Speech Language Pathology  [facility]  [phone]  SLP Clinical Swallow Evaluation       Patient: Ness Prieto   : 1940   MRN: [id number]      Evaluation Date: 3/3/2025      Admitting Dx: Loss of consciousness (HCC) [R40.20]  Junctional bradycardia [R00.1]  Acute cystitis without hematuria [N30.00]  Treatment Diagnosis: Oropharyngeal Dysphagia   Pain: Denies                                  Recommendations      Recommended Diet and Intervention 3/3/2025:  Diet Solids Recommendation:  NPO  with ongoing assessment of swallow function  Liquid Consistency Recommendation:  NPO Recommended form of Meds: Meds via alternative means          Compensatory strategies: Aspiration Precautions , To be determined     Discharge Recommendations:  Discharge recommendations to be determined pending ongoing follow-up during acute care stay    History/Course of Treatment     H&P: Ness Prieto is a 84 y.o. female with a pmh of Alzheimer's dementia, [facility] resident, bilateral AKA, hypertension, chronic A-fib.  She was found unresponsive and sweaty at the nursing home requiring her ED presentation.  Alert and interactive on ED presentation.  But noted with junctional bradycardia.  She has been admitted for further management.      Imaging:  Chest X-ray:   IMPRESSION:  No acute process.    History/Prior Level of Function:   Living Status: lives in a skilled nursing facility  Prior Dysphagia History: Per hard chart, pt on a Dysphagia II Minced and Moist with thin liquids at her ECF. Previously evaluated here 2023 with recommendation for Dysphagia III Soft and Bite-Sized with thin liquids. Pt with hx of dementia.     Reason for referral: SLP evaluation orders received due to altered mental status and dysphagia risk .      Evaluation/Treatment   DYSPHAGIA BEDSIDE SWALLOW EVALUATION   Dysphagia Impressions/Dysphagia Diagnosis: Oropharyngeal Dysphagia   Pt positioned upright in

## 2025-03-03 NOTE — H&P
V2.0  History and Physical      Name:  Ness Prieto /Age/Sex: 1940  (84 y.o. female)   MRN & CSN:  [id number] & [id number] Encounter Date/Time: 3/2/2025 9:39 PM EST   Location:  ED- PCP: Parish Strickland MD       Hospital Day: 1  The patient has confusion and unable to participate in history.  Had granddaughter who is her POA at bedside provided history.    Assessment and Plan:   Ness Prieto is a 84 y.o. female with a pmh of Alzheimer's dementia, [facility] resident, bilateral AKA, hypertension, chronic A-fib.  She was found unresponsive and sweaty at the nursing home requiring her ED presentation.  Alert and interactive on ED presentation.  But noted with junctional bradycardia.  She has been admitted for further management.    Hospital Problems             Last Modified POA    * (Principal) Loss of consciousness (HCC) 3/2/2025 Yes       Plan:  # Loss of consciousness.  # Acute encephalopathy.  # Bradycardia.  # History of chronic A-fib on diltiazem.  -Admit with telemetry.  -Hold diltiazem and metoprolol.  -Consult cardiology.  -Orthostatic vitals.  -Check echo    # Acute cystitis without hematuria.  History of ESBL.  Continue with Merrem.    # Mildly elevated troponin at 19 initially.  Repeat of 20.  No chest pain.  No acute ischemia on EKG.  Trend troponin.    # Hypertension.  Keep goal SBP less than 160.  On amlodipine.    # Bilateral AKA.  # Alzheimer's dementia without behavioral disturbances.  -Supportive care.    Disposition:   Current Living situation: [facility]  Expected Disposition: [facility]  Estimated D/C: 1 to 2 days    Diet Adult diet   DVT Prophylaxis [x] Lovenox, []  Heparin, [] SCDs, [] Ambulation,  [] Eliquis, [] Xarelto, [] Coumadin   Code Status Full code   Surrogate Decision Maker/ POA Self     Personally reviewed Lab Studies and Imaging     Discussed management of the case with Dr. Nina who recommended continue current plan of care    EKG interpreted  personally and results junctional bradycardia.    Imaging that was interpreted personally includes chest x-ray and results as below    Drugs that require monitoring for toxicity include none and the method of monitoring was none        History from:     Patient, ED providers report and chart review, Agustina    History of Present Illness:     Chief Complaint: Loss of consciousness  Ness Prieto is a 84 y.o. female with pmh of Alzheimer's dementia, [facility] resident, bilateral AKA, hypertension, chronic A-fib.  She was found unresponsive and sweaty at the nursing home requiring her ED presentation.  Alert and interactive on ED presentation but still confused.  Per Qiana, the patient is not at her baseline mental status and this is usually her mentation and behavior when she has a UTI.  Last son reports that she can be combative, pull off lines and tubings when she has UTI and she usually will require some restraints.  Noted with junctional bradycardia.  No reported fever, chills, chest pain, SOB, nausea, vomiting, diarrhea constipation.  Review of Systems:        Pertinent positives and negatives discussed in HPI     Objective:   No intake or output data in the 24 hours ending 03/02/25 2139   Vitals:   Vitals:    03/02/25 2000 03/02/25 2031 03/02/25 2056 03/02/25 2131   BP: 113/61 111/61 121/66 115/69   Pulse: 50 53 53 57   Resp: 19 13 20 17   Temp:       TempSrc:       SpO2:           Personally Reviewed Medications Prior to Admission     Prior to Admission medications    Medication Sig Start Date End Date Taking? Authorizing Provider   divalproex (DEPAKOTE SPRINKLE) 125 MG DR capsule Take 1 capsule by mouth in the morning and 1 capsule in the evening. Give one capsule by mouth every 12 hours for depression..   Yes Nona Quiroga MD   Nutritional Supplements (ENSURE CLEAR) LIQD Take 240 mLs by mouth 3 times daily   Yes Nona Quiroga MD   fosfomycin tromethamine (MONUROL) 3 g PACK Take 1

## 2025-03-03 NOTE — ED NOTES
Patient Name: Ness Prieto  : 1940 84 y.o.  MRN: [id number]  ED Room #: ED-     Chief complaint:   Chief Complaint   Patient presents with    Bradycardia     Brought in by EMS from [facility] for bradycardia and hypoxia. Per facility patient recently started on amlodipine 10 mg on .  Patient bilateral above the knee amputee. History of dementia (mostly non-verbal), CAD, and CVA.      Hospital Problem/Diagnosis:   Hospital Problems             Last Modified POA    * (Principal) Loss of consciousness (HCC) 3/2/2025 Yes         O2 Flow Rate:O2 Device: None (Room air)   (if applicable)  Cardiac Rhythm:   (if applicable)  Active LDA's:   Peripheral IV 25 Distal;Right;Anterior Wrist (Active)            How does patient ambulate? Wheelchair    2. How does patient take pills? Whole with Water    3. Is patient alert? Alert    4. Is patient oriented? To Person    5.   Patient arrived from:  [facility]  Facility Name: ___________________________________________    6. If patient is disoriented or from a Skill Nursing Facility has family been notified of admission? Yes    7. Patient belongings? Belongings: Clothing    Disposition of belongings? Kept with Patient     8. Any specific patient or family belongings/needs/dynamics?   a. N/A    9. Miscellaneous comments/pending orders?  a. ED orders complete at this time       If there are any additional questions please reach out to the Emergency Department.

## 2025-03-03 NOTE — ACP (ADVANCE CARE PLANNING)
Advance Care Planning     Advance Care Planning Inpatient Note  [facility] Department    Today's Date: 3/3/2025  Unit: [facility] 4 TOWER ORTHO/NEURO NURSING    Received request from HealthCare Provider.  Upon review of chart and communication with care team, patient's decision making abilities are not in question.. Patient was/were present in the room during visit.    Goals of ACP Conversation:  Discuss advance care planning documents    Health Care Decision Makers:       Summary:  No Decision Maker named by patient at this time    Advance Care Planning Documents (Patient Wishes):  None     Assessment:  The patient was sleep.   Interventions:  Patient was sleep.  left documents with a floor nurse. MR    Care Preferences Communicated:   No    Outcomes/Plan:  Patient was sleep.  will do a follow up. MR    Electronically signed by Chaplain Natacha on 3/3/2025 at 1:12 PM

## 2025-03-03 NOTE — PROGRESS NOTES
Consult noted for family request for psych evaluation. Patient will be seen tomorrow afternoon.   Discussed with POA to clarify consult request. Pt was at [facility] for UTI and had several psychotropics added for agitation like depakote, olanzapine, risperidone. Outpatient they stopped the risperidone 2.5 months ago to reduce polypharmacy and added mirtazapine to help appetite as patient was paranoid, refusing to eat. POA thinks she was less paranoid on risperidone.   Currently ordered for depakote 125mg BID, olanzapine 2.5mg qhs    Recommendations:   - Educated POA on course of delirium and challenges in dementia and elderly and precipitating factors. Educated on cardiac risk for antipsychotic use in dementia.    - will suggest to hospitalist to use olanzapine 2.5mg IM QID prn for agitation over the next 24 hrs, and keep 2.5mg qhs scheduled. If pt is responsive to this we can stay with olanzapine, otherwise can change to risperidone given positive response previously.    - depakote likely has little utility and we may stop this tomorrow.    - not ordered for mirtazapine right now, not critical to resume at this time.     POA was appreciative of the information and initial suggestions     Will complete full consult tomorrow.    Julio César Santana MD  Psychiatry

## 2025-03-03 NOTE — CONSULTS
Infectious Diseases   Consult Note        Admission Date: 3/2/2025  Hospital Day: Hospital Day: 2   Attending: Shiela Hammer MD  Date of service: 3/3/25     Reason for admission: Loss of consciousness (HCC) [R40.20]  Junctional bradycardia [R00.1]  Acute cystitis without hematuria [N30.00]    Chief complaint/ Reason for consult: Sepsis    Microbiology:        I have reviewed allavailable micro lab data and cultures    Results       Procedure Component Value Units Date/Time    Culture, Urine [[id number]] Collected: 03/02/25 1928    Order Status: No result Updated: 03/03/25 0222             No results found for the last 90 days.         Antibiotics and immunizations:       Current antibiotics: All antibiotics and their doses were reviewed by me    Recent Abx Admin                     meropenem (MERREM) 1,000 mg in sodium chloride 0.9 % 100 mL IVPB (addEASE) (mg) 1,000 mg New Bag 03/03/25 0625    meropenem (MERREM) 1,000 mg in sodium chloride 0.9 % 100 mL IVPB (addEASE) (mg) 1,000 mg New Bag 03/02/25 2125                      Immunization History: All immunization history was reviewed by me today.    There is no immunization history for the selected administration types on file for this patient.    Known drug allergies:     All allergies were reviewed and updated    Allergies   Allergen Reactions    Penicillins Hives and Itching       Social history:     Social History:  All social andepidemiologic history was reviewed and updated by me today as needed.     Tobacco use:   reports that she has never smoked. She has never used smokeless tobacco.  Alcohol use:   reports no history of alcohol use.  Currently lives in: [place] [id number]   reports no history of drug use.     COVID VACCINATION AND LAB RESULT RECORDS:     Internal Administration   First Dose      Second Dose           Last COVID Lab SARS-CoV-2 RNA, RT PCR (no units)   Date Value   10/04/2024 NOT DETECTED            Assessment:     The patient is a 84  y.o. old female who  has a past medical history of Abnormal coordination, Above knee amputation of left lower extremity (HCC), Acute cognitive decline, Acute kidney failure, Alzheimer's disease (HCC), Anemia, Atherosclerotic cardiovascular disease, Atrial fibrillation (HCC), Caloric malnutrition, Cerebral infarction due to embolism (HCC), Dementia (HCC), Difficulty in walking, Dysphagia, Generalized muscle weakness, Hyperlipidemia, Hypertension, Other symbolic dysfunctions (CODE), Peripheral vascular disease, and Primary generalized (osteo)arthritis. with following problems:    Altered mental status on admission  Sepsis on admission with tachypnea, bradycardia and hypotension  History of stroke  Chronic atrial fibrillation  Essential hypertension  Mixed hyperlipidemia  Peripheral vascular disease  Emaciation  History of ESBL  Need for contact isolation      Discussion:      The patient has a white cell count 7200 with hemoglobin of 9.7 and platelet count of 248,000.  Serum creatinine is 0.7.    Urinalysis was done yesterday.  It showed large leukocyte esterase and 4+ bacteria on the urinalysis.    I reviewed the imaging portable chest x-ray that was done yesterday independently reviewed the x-ray findings.  No pneumonia.    The patient is a resident of [facility] and is quite emaciated.    The patient has history of ESBL.  Urine culture done in October 2024 had grown ESBL Klebsiella      Plan:     Diagnostic Workup:    Follow-up on the urine culture that was sent out yesterday  Will order 2 sets of blood cultures for thoroughness of workup as the patient met sepsis criteria on admission  Continue to follow  fever curve, WBC count and blood cultures.  Continue to monitor blood counts, liver and renal function.    Antimicrobials:    Agree with IV meropenem.  Continue IV meropenem at a renal dose of 1 g every 12 hour for ESBL UTI  Contact isolation for ESBL  Monitor urine output closely  We will follow

## 2025-03-03 NOTE — ED PROVIDER NOTES
EMERGENCY DEPARTMENT PROVIDER NOTE    Patient Identification  Pt Name: Ness Prieto  MRN: [id number]  Birthdate 1940  Date of evaluation: 3/2/2025  Provider: Livan Salazar DO  PCP: Parish Strickland MD    Chief Complaint  Bradycardia (Brought in by EMS from [facility] for bradycardia and hypoxia. Per facility patient recently started on amlodipine 10 mg on 2/28.  Patient bilateral above the knee amputee. History of dementia (mostly non-verbal), CAD, and CVA. )      HPI  (History provided by patient, EMS and record review)  This is a 84 y.o. female with pertinent past medical history of Alzheimer's dementia, paroxysmal atrial fibrillation, hypertension, peripheral vascular disease with bilateral AKA's who was brought in by EMS transportation for altered mental status.  Patient alert and able to state her name on arrival to the ED, however unable to contribute much more meaningfully to history.  Per EMS, patient was found by nursing home staff to be less responsive than typical and appeared diaphoretic, she was thus transported to the emergency department for evaluation.  Notably bradycardic on arrival here..       I have reviewed the following nursing documentation:  Allergies: Penicillins    Past medical history:   Past Medical History:   Diagnosis Date    Abnormal coordination     Above knee amputation of left lower extremity (HCC)     Acute cognitive decline     Acute kidney failure     Alzheimer's disease (HCC)     Anemia     Atherosclerotic cardiovascular disease     Atrial fibrillation (HCC)     Caloric malnutrition     Cerebral infarction due to embolism (HCC)     Dementia (HCC)     Difficulty in walking     Dysphagia     Generalized muscle weakness     Hyperlipidemia     Hypertension     Other symbolic dysfunctions (CODE)     Peripheral vascular disease     Primary generalized (osteo)arthritis      Past surgical history:   Past Surgical History:   Procedure Laterality Date    ABOVE KNEE AMPUTATION

## 2025-03-03 NOTE — PROGRESS NOTES
Shift assessment completed and charted. VSS. Patient is drowsy, spontaneously opens her yes when aroused but quickly returns to resting with her eyes closed. Respirations even and unlabored. Standard safety measures in place. Patient had a BM this morning, charisse care performed and new purwick placed, zinc applied to healing sacral wounds. Granddaughter updated on POC. Patient refused morning medications and drank a small amount of apple juice. Speech and dietician consulted. SpO2 > 90% on room air. Patient stable and denied needs when writer left room.    11:24 AM  Patient's blood sugar 65, hypoglycemia protocol placed. Dextrose administered per MAR.    12:28 PM  Rn notified provider that after first dextrose bolus patient's blood sugar was 60, second dose giver per MD and protocol and blood sugar is 139 at this time.

## 2025-03-03 NOTE — CARE COORDINATION
Discharge Planning Note:    Chart reviewed and it appears that patient has minimal needs for discharge at this time. Risk Score 13 %     Primary Care Physician is LAMBERT MORELOS   Primary insurance is Medicaid    Patient is not from home. CM verifying where patient lives.    Please notify case management if any discharge needs are identified.      Case management will continue to follow progress and update discharge plan as needed.     Electronically signed by TIERRA Teran on 3/3/2025 at 8:48 AM

## 2025-03-03 NOTE — PROGRESS NOTES
Pharmacy Home Medication Reconciliation Note    A medication reconciliation has been completed for Ness Prieto 1940    Pharmacy: [facility] [address], [place], [place]  Information provided by: facility    The patient's home medication list is as follows:  No current facility-administered medications on file prior to encounter.     Current Outpatient Medications on File Prior to Encounter   Medication Sig Dispense Refill    divalproex (DEPAKOTE SPRINKLE) 125 MG DR capsule Take 1 capsule by mouth in the morning and 1 capsule in the evening. Give one capsule by mouth every 12 hours for depression..      Nutritional Supplements (ENSURE CLEAR) LIQD Take 240 mLs by mouth 3 times daily      fosfomycin tromethamine (MONUROL) 3 g PACK Take 1 packet by mouth See Admin Instructions Give 1 packet by mouth at bedtime every 10 days for UTI prevention. Mix in 6-8 ounces of water.      Multiple Vitamins-Minerals (THERAPEUTIC MULTIVITAMIN-MINERALS) tablet Take 1 tablet by mouth daily      sennosides-docusate sodium (SENOKOT-S) 8.6-50 MG tablet Take 1 tablet by mouth 2 times daily      OLANZapine (ZYPREXA) 2.5 MG tablet Take 1 tablet by mouth nightly      metoprolol succinate (TOPROL XL) 100 MG extended release tablet Take 1 tablet by mouth daily      estradiol (ESTRACE) 0.1 MG/GM vaginal cream Place 1 g vaginally nightly      potassium chloride (KLOR-CON M) 20 MEQ extended release tablet Take 1 tablet by mouth daily      melatonin 3 MG TABS tablet Take 2 tablets by mouth nightly      amLODIPine (NORVASC) 10 MG tablet Take 1 tablet by mouth daily      aspirin 81 MG chewable tablet Take 1 tablet by mouth daily      atorvastatin (LIPITOR) 40 MG tablet Take 1 tablet by mouth nightly      dilTIAZem (CARDIZEM CD) 240 MG extended release capsule Take 1 capsule by mouth daily      mirtazapine (REMERON) 7.5 MG tablet Take 1 tablet by mouth nightly (Patient not taking: Reported on 3/2/2025)      bisacodyl  (DULCOLAX) 10 MG suppository Place 1 suppository rectally daily as needed for Constipation (Patient not taking: Reported on 3/2/2025)      risperiDONE (RISPERDAL) 0.5 MG tablet Take 1 tablet by mouth daily (Patient not taking: Reported on 3/2/2025)      docusate sodium (COLACE) 100 MG capsule Take 1 capsule by mouth 2 times daily (Patient not taking: Reported on 3/2/2025)      magnesium hydroxide (MILK OF MAGNESIA) 400 MG/5ML suspension Take 30 mLs by mouth daily as needed for Constipation      acetaminophen (TYLENOL) 325 MG tablet Take 2 tablets by mouth every 6 hours as needed for Pain         Patient is no longer taking bisacodyl, Colace, mirtazapine, or risperidone.    Of note, patient received all AM and midday meds prior to ED arrival.    Timing of last doses updated.    Thank you,  Meghana Ramirez, GIOVANNYhT

## 2025-03-03 NOTE — PROGRESS NOTES
V2.0    [facility] Progress Note      Name:  Ness Prieto /Age/Sex: 1940  (84 y.o. female)   MRN & CSN:  [id number] & [id number] Encounter Date/Time: 3/3/2025 6:06 PM EST   Location:  [id number] PCP: Parish Strickland MD     Attending:Shiela Hammer MD       Hospital Day: 2    Assessment and Recommendations   Ness Prieto is a 84 y.o. female with pmh of Alzheimer's dementia, h/o CVA, recurrent UTIs, MDRO, [facility] resident, bilateral AKA, hypertension, chronic A-fib, anemia s/p watchman implant.  She was found unresponsive and sweaty at the nursing home requiring her ED presentation.  Patient reportedly alert and interactive on ED presentation.  EKG showed junctional rhythm with bradycardia.  She was admitted for further management.       Plan:     Loss of consciousness: ?  Due to bradycardia.  Acute metabolic encephalopathy:  Underlying dementia with episodes of paranoia; on Zyprexa and Depakote. Risperdal discontinued outpatient.  Chest x-ray negative for acute process.  Continue current management.  Family requesting psych evaluation to adjust medications.  Follow-up CT head, folate, B12 nd ammonia level.    Oropharyngeal dysphagia: SLP evaluation appreciated.  Currently NPO due to lethargy, oral holding and decreased ability to follow commands.  Monitor on dextrose in LR.  Every 4 hours fingersticks.  Hypoglycemic protocol in place.    Bradycardia: Resolved. Junctional rhythm on EKG.  History of longstanding A-fib; follows at .  Currently on Toprol- mg daily and Cardizem to 40 mg daily.  EP consulted: Recommended reducing dose of Toprol-XL to 50 mg daily and Cardizem to 120 mg daily.  IV metoprolol as needed since NPO.    Suspected UTI:   UA with pyuria and hematuria, hyaline casts.  History of ESBL.   Blood and urine cultures pending.  ID consulted: Continue with Merrem pending cultures.     Mild troponin elevation: hs-troponin 19, 20, 16.    No acute ischemia on EKG.  TTE 3/3/2025:  Date/Time    CHOL 103 09/19/2023 04:37 AM    HDL 49 09/19/2023 04:37 AM    TRIG 38 09/19/2023 04:37 AM     Hemoglobin A1C:   Lab Results   Component Value Date/Time    LABA1C 5.1 09/19/2023 04:37 AM     TSH: No results found for: \"TSH\"  Troponin: No results found for: \"TROPONINT\"  Lactic Acid: No results for input(s): \"LACTA\" in the last 72 hours.  BNP: No results for input(s): \"PROBNP\" in the last 72 hours.  UA:  Lab Results   Component Value Date/Time    NITRU Negative 03/02/2025 07:28 PM    COLORU DARK YELLOW 03/02/2025 07:28 PM    PHUR 7.5 03/02/2025 07:28 PM    PHUR 5.5 09/18/2023 10:32 PM    WBCUA 110 03/02/2025 07:28 PM    RBCUA 12 03/02/2025 07:28 PM    BACTERIA 4+ 03/02/2025 07:28 PM    CLARITYU TURBID 03/02/2025 07:28 PM    LEUKOCYTESUR LARGE 03/02/2025 07:28 PM    UROBILINOGEN 1.0 03/02/2025 07:28 PM    BILIRUBINUR SMALL 03/02/2025 07:28 PM    BLOODU SMALL 03/02/2025 07:28 PM    GLUCOSEU Negative 03/02/2025 07:28 PM    KETUA TRACE 03/02/2025 07:28 PM     Urine Cultures:   Lab Results   Component Value Date/Time    LABURIN >100,000 CFU/ml 10/04/2024 11:26 AM    LABURIN  10/04/2024 11:26 AM     75,000 CFU/ml  CONTACT PRECAUTIONS INDICATED  Carbapenem antibiotics are the best option for infections caused  by ESBL producing organisms.  Other antibiotic classes are  likely to result in treatment failure, even for organisms  demonstrating in vitro susceptibility.       Blood Cultures: No results found for: \"BC\"  No results found for: \"BLOODCULT2\"  Organism:   Lab Results   Component Value Date/Time    ORG Escherichia coli 10/04/2024 11:26 AM    ORG Klebsiella pneumoniae ESBL 10/04/2024 11:26 AM         Electronically signed by Shiela Hammer MD on 3/3/2025 at 6:31 PM

## 2025-03-03 NOTE — PLAN OF CARE
Problem: Safety - Adult  Goal: Free from fall injury  3/3/2025 1444 by Enma Gamez, RN  Outcome: Progressing  3/3/2025 0309 by Melony Lim RN  Outcome: Progressing     Problem: Chronic Conditions and Co-morbidities  Goal: Patient's chronic conditions and co-morbidity symptoms are monitored and maintained or improved  Outcome: Progressing     Problem: Discharge Planning  Goal: Discharge to home or other facility with appropriate resources  Outcome: Progressing     Problem: Skin/Tissue Integrity  Goal: Skin integrity remains intact  Description: 1.  Monitor for areas of redness and/or skin breakdown  2.  Assess vascular access sites hourly  3.  Every 4-6 hours minimum:  Change oxygen saturation probe site  4.  Every 4-6 hours:  If on nasal continuous positive airway pressure, respiratory therapy assess nares and determine need for appliance change or resting period  Outcome: Progressing     Problem: Confusion  Goal: Confusion, delirium, dementia, or psychosis is improved or at baseline  Description: INTERVENTIONS:  1. Assess for possible contributors to thought disturbance, including medications, impaired vision or hearing, underlying metabolic abnormalities, dehydration, psychiatric diagnoses, and notify attending LIP  2. [place] high risk fall precautions, as indicated  3. Provide frequent short contacts to provide reality reorientation, refocusing and direction  4. Decrease environmental stimuli, including noise as appropriate  5. Monitor and intervene to maintain adequate nutrition, hydration, elimination, sleep and activity  6. If unable to ensure safety without constant attention obtain sitter and review sitter guidelines with assigned personnel  7. Initiate Psychosocial CNS and Spiritual Care consult, as indicated  Outcome: Progressing     Problem: Pain  Goal: Verbalizes/displays adequate comfort level or baseline comfort level  3/3/2025 1444 by Enma Gamez, RN  Outcome: Progressing  3/3/2025 0309

## 2025-03-03 NOTE — CONSULTS
overlaying the watchman device. This prominent coumadin ridge was seen on     previous RIMMA's. There was no thrombus seen on the device. There was no significant flow seen     around the device.   - Right ventricle: Systolic function was normal.     CXR neg  Scheduled Meds:   aspirin  81 mg Oral Daily    atorvastatin  40 mg Oral Nightly    divalproex  125 mg Oral BID    estradiol  1 g Vaginal Nightly    melatonin  6 mg Oral Nightly    therapeutic multivitamin-minerals  1 tablet Oral Daily    OLANZapine  2.5 mg Oral Nightly    meropenem  1,000 mg IntraVENous Q8H    sodium chloride flush  5-40 mL IntraVENous 2 times per day    enoxaparin  40 mg SubCUTAneous Daily     Continuous Infusions:   sodium chloride       PRN Meds:.sodium chloride flush, sodium chloride, potassium chloride **OR** potassium alternative oral replacement **OR** potassium chloride, magnesium sulfate, ondansetron **OR** ondansetron, polyethylene glycol, acetaminophen **OR** acetaminophen, sulfur hexafluoride microspheres     Prior to Admission medications    Medication Sig Start Date End Date Taking? Authorizing Provider   Nutritional Supplements (ENSURE CLEAR) LIQD Take 240 mLs by mouth 3 times daily   Yes Nona Quiroga MD   fosfomycin tromethamine (MONUROL) 3 g PACK Take 1 packet by mouth See Admin Instructions Give 1 packet by mouth at bedtime every 10 days for UTI prevention. Mix in 6-8 ounces of water.   Yes Nona Quiroga MD   Multiple Vitamins-Minerals (THERAPEUTIC MULTIVITAMIN-MINERALS) tablet Take 1 tablet by mouth daily   Yes Nona Quiroga MD   sennosides-docusate sodium (SENOKOT-S) 8.6-50 MG tablet Take 1 tablet by mouth 2 times daily   Yes Nona Quiroga MD   dilTIAZem (DILACOR XR) 240 MG extended release capsule Take 1 capsule by mouth daily 2/3/25  Yes Nona Quiroga MD   divalproex (DEPAKOTE) 125 MG DR tablet Take 1 tablet by mouth in the morning and at bedtime 1/31/25  Yes Nona Quiroga MD  distress. sleepy  Cardiovascular: Bradycardic, irregular rhythm, S1&S2.    Pulmonary/Chest: Bilateral respiratory sounds. No rhonchi.    Abdominal: Soft. 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)     Acute cognitive decline     Acute kidney failure     Alzheimer's disease (HCC)     Anemia     Atherosclerotic cardiovascular disease     Atrial fibrillation (HCC)     Caloric malnutrition     Cerebral infarction due to embolism (HCC)     Dementia (HCC)     Difficulty in walking     Dysphagia     Generalized muscle weakness     Hyperlipidemia     Hypertension     Other symbolic dysfunctions (CODE)     Peripheral vascular disease     Primary generalized (osteo)arthritis         Past Surgical History:   Procedure Laterality Date    ABOVE KNEE AMPUTATION      left       Allergies   Allergen Reactions    Penicillins Hives and Itching        reports that she has never smoked. She has never used smokeless tobacco. She reports that she does not drink alcohol and does not use drugs.        I independently reviewed and interpreted ECG, telemetry, cardiac labs, other relevant labs,    CXR ,  . Unless otherwise reflected in the note, my interpretation is in agreement with the report and use them for decision making whether mentioned or not.  All previous relevant notes including notes and data from other hospitals and prior records were reviewed.    Thank you for allowing me to participate in the care of this patient.         NOTE: This report was transcribed using voice recognition software. Every effort was made to ensure accuracy, however, inadvertent computerized transcription errors may be present.

## 2025-03-03 NOTE — PROGRESS NOTES
4 Eyes Skin Assessment     NAME:  Ness Prieto  YOB: 1940  MEDICAL RECORD NUMBER:  [id number]    The patient is being assessed for  Admission    I agree that at least one RN has performed a thorough Head to Toe Skin Assessment on the patient. ALL assessment sites listed below have been assessed.      Areas assessed by both nurses:    Head, Face, Ears, Shoulders, Back, Chest, Arms, Elbows, Hands, Sacrum. Buttock, Coccyx, Ischium, Legs. Feet and Heels, Under Medical Devices , and Other          Does the Patient have a Wound? Yes wound(s) were present on assessment. LDA wound assessment was Initiated and completed by RN healing wounds on buttocks       Cristiano Prevention initiated by RN: Yes  Wound Care Orders initiated by RN: No    Pressure Injury (Stage 3,4, Unstageable, DTI, NWPT, and Complex wounds) if present, place Wound referral order by RN under : No    New Ostomies, if present place, Ostomy referral order under : No     Nurse 1 eSignature: Electronically signed by Melony Lim RN on 3/3/25 at 4:43 AM EST    Nurse 2 eSignature: Electronically signed by Danica Dickey RN on 3/3/25 at 6:17 AM EST

## 2025-03-03 NOTE — PROGRESS NOTES
Pt alert and oriented x1, VSS, IV ns locked in right arm. Unable to do standing part of orthostatics d/t bilateral AKA. Lungs are clear and diminished. Pt voiding per purewick. Pt is being turned and repositioned q2h with pillow support. Granddaughter at bedside. Bed alarm on, bedside table and call light in reach.

## 2025-03-04 ENCOUNTER — APPOINTMENT (OUTPATIENT)
Dept: MRI IMAGING | Age: 85
DRG: 045 | End: 2025-03-04
Payer: MEDICAID

## 2025-03-04 PROBLEM — N30.00 ACUTE CYSTITIS WITHOUT HEMATURIA: Status: ACTIVE | Noted: 2025-03-04

## 2025-03-04 PROBLEM — R55 SYNCOPE AND COLLAPSE: Status: ACTIVE | Noted: 2025-03-04

## 2025-03-04 PROBLEM — I34.0 NONRHEUMATIC MITRAL VALVE REGURGITATION: Status: ACTIVE | Noted: 2025-03-04

## 2025-03-04 PROBLEM — G93.40 ACUTE ENCEPHALOPATHY: Status: ACTIVE | Noted: 2025-03-04

## 2025-03-04 PROBLEM — B96.89 URINARY TRACT INFECTION DUE TO ESBL KLEBSIELLA: Status: ACTIVE | Noted: 2017-12-03

## 2025-03-04 PROBLEM — F02.80 DEMENTIA DUE TO ALZHEIMER'S DISEASE (HCC): Status: ACTIVE | Noted: 2025-03-04

## 2025-03-04 PROBLEM — G30.9 DEMENTIA DUE TO ALZHEIMER'S DISEASE (HCC): Status: ACTIVE | Noted: 2025-03-04

## 2025-03-04 PROBLEM — F03.90 MAJOR NEUROCOGNITIVE DISORDER (HCC): Status: ACTIVE | Noted: 2025-03-04

## 2025-03-04 LAB
25(OH)D3 SERPL-MCNC: 38.1 NG/ML
ALBUMIN SERPL-MCNC: 3.3 G/DL (ref 3.4–5)
ALBUMIN/GLOB SERPL: 1.1 {RATIO} (ref 1.1–2.2)
ALP SERPL-CCNC: 87 U/L (ref 40–129)
ALT SERPL-CCNC: 13 U/L (ref 10–40)
AMMONIA PLAS-SCNC: <10 UMOL/L (ref 11–51)
ANION GAP SERPL CALCULATED.3IONS-SCNC: 8 MMOL/L (ref 3–16)
AST SERPL-CCNC: 20 U/L (ref 15–37)
BASOPHILS # BLD: 0.1 K/UL (ref 0–0.2)
BASOPHILS NFR BLD: 1.1 %
BILIRUB SERPL-MCNC: 0.5 MG/DL (ref 0–1)
BUN SERPL-MCNC: 9 MG/DL (ref 7–20)
CALCIUM SERPL-MCNC: 9 MG/DL (ref 8.3–10.6)
CHLORIDE SERPL-SCNC: 105 MMOL/L (ref 99–110)
CO2 SERPL-SCNC: 27 MMOL/L (ref 21–32)
CREAT SERPL-MCNC: 0.6 MG/DL (ref 0.6–1.2)
DEPRECATED RDW RBC AUTO: 14.8 % (ref 12.4–15.4)
EOSINOPHIL # BLD: 0.2 K/UL (ref 0–0.6)
EOSINOPHIL NFR BLD: 2.6 %
FERRITIN SERPL IA-MCNC: 280 NG/ML (ref 15–150)
FOLATE SERPL-MCNC: 17.2 NG/ML (ref 4.78–24.2)
FOLATE SERPL-MCNC: 19.1 NG/ML (ref 4.78–24.2)
GFR SERPLBLD CREATININE-BSD FMLA CKD-EPI: 88 ML/MIN/{1.73_M2}
GLUCOSE BLD-MCNC: 70 MG/DL (ref 70–99)
GLUCOSE BLD-MCNC: 76 MG/DL (ref 70–99)
GLUCOSE BLD-MCNC: 83 MG/DL (ref 70–99)
GLUCOSE BLD-MCNC: 96 MG/DL (ref 70–99)
GLUCOSE SERPL-MCNC: 106 MG/DL (ref 70–99)
HCT VFR BLD AUTO: 30.1 % (ref 36–48)
HGB BLD-MCNC: 10.4 G/DL (ref 12–16)
IRON SATN MFR SERPL: 41 % (ref 15–50)
IRON SERPL-MCNC: 75 UG/DL (ref 37–145)
LYMPHOCYTES # BLD: 1.5 K/UL (ref 1–5.1)
LYMPHOCYTES NFR BLD: 22.8 %
MAGNESIUM SERPL-MCNC: 1.58 MG/DL (ref 1.8–2.4)
MCH RBC QN AUTO: 30.3 PG (ref 26–34)
MCHC RBC AUTO-ENTMCNC: 34.7 G/DL (ref 31–36)
MCV RBC AUTO: 87.3 FL (ref 80–100)
MONOCYTES # BLD: 0.7 K/UL (ref 0–1.3)
MONOCYTES NFR BLD: 10.6 %
NEUTROPHILS # BLD: 4.1 K/UL (ref 1.7–7.7)
NEUTROPHILS NFR BLD: 62.9 %
PERFORMED ON: NORMAL
PHOSPHATE SERPL-MCNC: 2.4 MG/DL (ref 2.5–4.9)
PLATELET # BLD AUTO: 261 K/UL (ref 135–450)
PMV BLD AUTO: 8.1 FL (ref 5–10.5)
POTASSIUM SERPL-SCNC: 3.8 MMOL/L (ref 3.5–5.1)
PROT SERPL-MCNC: 6.2 G/DL (ref 6.4–8.2)
RBC # BLD AUTO: 3.45 M/UL (ref 4–5.2)
SODIUM SERPL-SCNC: 140 MMOL/L (ref 136–145)
TIBC SERPL-MCNC: 184 UG/DL (ref 260–445)
TSH SERPL DL<=0.005 MIU/L-ACNC: 2.37 UIU/ML (ref 0.27–4.2)
VIT B12 SERPL-MCNC: 1033 PG/ML (ref 211–911)
VIT B12 SERPL-MCNC: 1041 PG/ML (ref 211–911)
WBC # BLD AUTO: 6.5 K/UL (ref 4–11)

## 2025-03-04 PROCEDURE — 85025 COMPLETE CBC W/AUTO DIFF WBC: CPT

## 2025-03-04 PROCEDURE — 6370000000 HC RX 637 (ALT 250 FOR IP): Performed by: PSYCHIATRY & NEUROLOGY

## 2025-03-04 PROCEDURE — 92526 ORAL FUNCTION THERAPY: CPT

## 2025-03-04 PROCEDURE — G0545 PR INHERENT VISIT TO INPT: HCPCS | Performed by: INTERNAL MEDICINE

## 2025-03-04 PROCEDURE — 99223 1ST HOSP IP/OBS HIGH 75: CPT | Performed by: PSYCHIATRY & NEUROLOGY

## 2025-03-04 PROCEDURE — 80053 COMPREHEN METABOLIC PANEL: CPT

## 2025-03-04 PROCEDURE — 2580000003 HC RX 258: Performed by: INTERNAL MEDICINE

## 2025-03-04 PROCEDURE — 99233 SBSQ HOSP IP/OBS HIGH 50: CPT | Performed by: INTERNAL MEDICINE

## 2025-03-04 PROCEDURE — 82746 ASSAY OF FOLIC ACID SERUM: CPT

## 2025-03-04 PROCEDURE — APPNB30 APP NON BILLABLE TIME 0-30 MINS

## 2025-03-04 PROCEDURE — 83735 ASSAY OF MAGNESIUM: CPT

## 2025-03-04 PROCEDURE — 6360000002 HC RX W HCPCS: Performed by: NURSE PRACTITIONER

## 2025-03-04 PROCEDURE — 99232 SBSQ HOSP IP/OBS MODERATE 35: CPT | Performed by: NURSE PRACTITIONER

## 2025-03-04 PROCEDURE — 82728 ASSAY OF FERRITIN: CPT

## 2025-03-04 PROCEDURE — 84443 ASSAY THYROID STIM HORMONE: CPT

## 2025-03-04 PROCEDURE — 84100 ASSAY OF PHOSPHORUS: CPT

## 2025-03-04 PROCEDURE — 82607 VITAMIN B-12: CPT

## 2025-03-04 PROCEDURE — 82306 VITAMIN D 25 HYDROXY: CPT

## 2025-03-04 PROCEDURE — 1200000000 HC SEMI PRIVATE

## 2025-03-04 PROCEDURE — 83550 IRON BINDING TEST: CPT

## 2025-03-04 PROCEDURE — 99254 IP/OBS CNSLTJ NEW/EST MOD 60: CPT | Performed by: PSYCHIATRY & NEUROLOGY

## 2025-03-04 PROCEDURE — 83540 ASSAY OF IRON: CPT

## 2025-03-04 PROCEDURE — 36415 COLL VENOUS BLD VENIPUNCTURE: CPT

## 2025-03-04 PROCEDURE — 6360000002 HC RX W HCPCS: Performed by: INTERNAL MEDICINE

## 2025-03-04 PROCEDURE — 6370000000 HC RX 637 (ALT 250 FOR IP): Performed by: INTERNAL MEDICINE

## 2025-03-04 PROCEDURE — 82140 ASSAY OF AMMONIA: CPT

## 2025-03-04 PROCEDURE — 2500000003 HC RX 250 WO HCPCS: Performed by: INTERNAL MEDICINE

## 2025-03-04 RX ORDER — RISPERIDONE 0.5 MG/1
0.25 TABLET ORAL EVERY EVENING
Status: DISCONTINUED | OUTPATIENT
Start: 2025-03-04 | End: 2025-03-08 | Stop reason: HOSPADM

## 2025-03-04 RX ORDER — DILTIAZEM HYDROCHLORIDE 120 MG/1
120 CAPSULE, COATED, EXTENDED RELEASE ORAL DAILY
Status: DISCONTINUED | OUTPATIENT
Start: 2025-03-04 | End: 2025-03-08 | Stop reason: HOSPADM

## 2025-03-04 RX ORDER — RISPERIDONE 0.5 MG/1
0.5 TABLET ORAL EVERY EVENING
Status: DISCONTINUED | OUTPATIENT
Start: 2025-03-04 | End: 2025-03-04

## 2025-03-04 RX ADMIN — SODIUM CHLORIDE, SODIUM LACTATE, POTASSIUM CHLORIDE, CALCIUM CHLORIDE AND DEXTROSE MONOHYDRATE: 5; 600; 310; 30; 20 INJECTION, SOLUTION INTRAVENOUS at 20:47

## 2025-03-04 RX ADMIN — ATORVASTATIN CALCIUM 40 MG: 40 TABLET, FILM COATED ORAL at 17:16

## 2025-03-04 RX ADMIN — MAGNESIUM SULFATE HEPTAHYDRATE 2000 MG: 40 INJECTION, SOLUTION INTRAVENOUS at 08:55

## 2025-03-04 RX ADMIN — POTASSIUM PHOSPHATE, MONOBASIC POTASSIUM PHOSPHATE, DIBASIC 15 MMOL: 224; 236 INJECTION, SOLUTION, CONCENTRATE INTRAVENOUS at 17:15

## 2025-03-04 RX ADMIN — ENOXAPARIN SODIUM 40 MG: 100 INJECTION SUBCUTANEOUS at 08:55

## 2025-03-04 RX ADMIN — MEROPENEM 1000 MG: 1 INJECTION INTRAVENOUS at 05:24

## 2025-03-04 RX ADMIN — RISPERIDONE 0.25 MG: 0.5 TABLET, FILM COATED ORAL at 17:16

## 2025-03-04 RX ADMIN — MEROPENEM 1000 MG: 1 INJECTION INTRAVENOUS at 17:15

## 2025-03-04 RX ADMIN — DEXTROSE 125 ML: 10 SOLUTION INTRAVENOUS at 12:22

## 2025-03-04 ASSESSMENT — PAIN SCALES - GENERAL: PAINLEVEL_OUTOF10: 0

## 2025-03-04 NOTE — PROGRESS NOTES
Infectious Diseases   Progress Note      Admission Date: 3/2/2025  Hospital Day: Hospital Day: 3   Attending: Penny Han MD  Date of service: 3/4/2025     Chief complaint/ Reason for consult:     Altered mental status on admission  Sepsis on admission with tachypnea, bradycardia and hypotension  ESBL Klebsiella urinary tract infection  Need for contact isolation  History of stroke  Chronic atrial fibrillation  Essential hypertension  Mixed hyperlipidemia    Microbiology:      I have reviewed allavailable micro lab data and cultures    Results       Procedure Component Value Units Date/Time    Culture, Blood 2 [[id number]] Collected: 03/03/25 1308    Order Status: Completed Specimen: Blood Updated: 03/04/25 1415     Culture, Blood 2 No Growth to date.  Any change in status will be called.    Narrative:      ORDER#: [id number]                          ORDERED BY: BRANDEN THOMASON  SOURCE: Blood Hand, Right                  COLLECTED:  03/03/25 13:08  ANTIBIOTICS AT SYD.:                      RECEIVED :  03/03/25 20:42  If child <=2 yrs old please draw pediatric bottle.~Blood Culture #2    Culture, Blood 1 [[id number]] Collected: 03/03/25 1307    Order Status: Completed Specimen: Blood Updated: 03/04/25 1415     Blood Culture, Routine No Growth to date.  Any change in status will be called.    Narrative:      ORDER#: [id number]                          ORDERED BY: BRANDEN THOMASON  SOURCE: Blood Hand, Right                  COLLECTED:  03/03/25 13:07  ANTIBIOTICS AT SYD.:                      RECEIVED :  03/03/25 20:42  If child <=2 yrs old please draw pediatric bottle.~Blood Culture 1    Culture, Urine [[id number]]  (Abnormal)  (Susceptibility) Collected: 03/02/25 1928    Order Status: Completed Specimen: Urine, clean catch Updated: 03/04/25 1332     Organism Klebsiella pneumoniae ESBL     Urine Culture, Routine --     >100,000 CFU/ml  CONTACT PRECAUTIONS INDICATED  Carbapenem antibiotics are the best option for  I34.0    Acute encephalopathy G93.40    Syncope and collapse R55       Please note that this chart was generated using Dragon dictation software. Although every effort was made to ensure the accuracy of this automated transcription, some errors in transcription may have occurred inadvertently. If you may need any clarification, please do not hesitate to contact me through EPIC or at the phone number provided below with my electronic signature.  Any pictures or media included in this note were obtained after taking informed verbal consent from the patient and with their approval to include those in the patient's medical record.        Vidal Thomas MD, MPH, FACP, FIDSA  3/4/2025, 2:18 PM  Central Office Phone: [phone]  Central Office Fax: [phone]    [facility]   [address], [address] ([address])  [place] [address]  [facility] days:  Tuesday & Thursday AM    [facility]  [address] , [address] (Medical office Building)  [place], [place], [address]  [facility] days: Wednesday AM

## 2025-03-04 NOTE — PROGRESS NOTES
Confused, granddaughter at bedside, Ct head completed, NPO for speech evolve, IV 5% dextrose on continuous,  hypoglycemic- bolus dextrose given.  Michelle Mendoza RN

## 2025-03-04 NOTE — CONSULTS
PSYCHIATRY CONSULT, INITIAL EVALUATION    Attending Provider:  Penny Han MD    CC/Reason for Consult: family request, medication recommendations    HPI:   context: Pt is an 85 yo F with hx of alzheimers dementia, CVA, recurrent UTIs, bilateral AKA, chronic A.fib, presented after being unresponsive at the nursing home. She was initially shown to be bradycardic. Pt has been on several psychotropic medications outpatient to manage behavior associated with dementia and psych was consulted for assessment.     associated symptoms:   Pt was noted to be lethargic for most of yesterday and into the evening. Her scheduled dose of olanzapine at night was held for this reason.   At the time of my assessment she is awake, stares blankly at me and around the room, and doesn't answer most questions except for repeating her name after I stated it.     I discussed the case with her granddaughter, POA, yesterday. Pt was admitted at [facility] for UTI and AMS and depakote, geodon IM, olanzapine and risperidone were introduced to managed her symptoms. About 2.5 months ago risperidone was stopped outpatient to reduce polypharmacy, and mirtazapine was introduced to help with reduced appetite and weight loss which didn't seem to help. Patient has been more paranoid in general over the last couple months, often questioning food and not wanting to eat. She tends to get more confused in the evening. Granddaughter is aware of increased risk of confusion from UTI and hospitalization and is hoping to stay ahead of this by making necessary medication adjustments. Risperidone she felt was more helpful for her.     modifying factors: dementia, UTI  Timing: subacute on chronic  duration: worse over the last 2.5 months  severity: severe    ROS:   Unable to respond to ROS questions    Past Psychiatric History:   Diagnoses: alzheimers disease  Med trials: olanzapine, risperidone, geodon IM, mirtazapine. depakote    Substance Use

## 2025-03-04 NOTE — PROGRESS NOTES
V2.0    [facility] Progress Note      Name:  Ness Prieto /Age/Sex: 1940  (84 y.o. female)   MRN & CSN:  [id number] & [id number] Encounter Date/Time: 3/4/2025 6:06 PM EST   Location:  [id number] PCP: Parish Strickland MD     Attending:Penny Han MD       Hospital Day: 3    Brief course   This 84 y.o. female with PMHx of of Alzheimer's dementia, hx CVA, recurrent UTIs, MDRO, [facility] resident, bilateral AKA, hypertension, anemia, chronic A-fib, s/p watchman implant who was found unresponsive and sweaty at the nursing home.  Pt reportedly alert and interactive on ED presentation.  EKG on admission showed junctional rhythm with bradycardia.     Interval history:  Pt seen and examined today.  Overnight events noted, interval ancillary notes and labs reviewed.   On RA satting well.  Afebrile overnight, WBC WNL.  Blood cultures pending.  Urine culture grew> 100% Klebsiella pneumonia  Patient remained confused.  Unable to obtain ROS.  Kept repeating I do not know  MRI brain could not be obtained as patient was unable to lay flat  Remains n.p.o. per speech recs.  Palliative care consulted      Assessment and plan:    Acute encephalopathy likely delirium in setting of UTI/hospitalization/recent bradycardia episode.  Patient with known Hx of Alzheimer disease  Psychiatry consulted.  Depakote sprinkle and olanzapine DC'd.  Switch to risperidone 0.25 mg nightly.  Continue as needed olanzapine 2.5 mg IM twice daily.  CT head showed interval progression of periventricular, deep and subcortical white matter hypodensity within left parietal and occipital lobe compared to CT .  MRI brain recommended.    ESBL Klebsiella UTI; Urine culture grew> 100% Klebsiella pneumonia  ID IV meropenem per the recs    Loss of consciousness: ?  Due to bradycardia; resolved  Follow-up CT head, folate, B12 nd ammonia level.    Bradycardia: Resolved. Junctional rhythm on EKG.  Hx of longstanding A-fib; follows with UC.  Was on  04:37 AM    TRIG 38 09/19/2023 04:37 AM     Hemoglobin A1C:   Lab Results   Component Value Date/Time    LABA1C 5.1 09/19/2023 04:37 AM     TSH:   Lab Results   Component Value Date/Time    TSH 2.37 03/04/2025 04:53 AM     Troponin: No results found for: \"TROPONINT\"  Lactic Acid: No results for input(s): \"LACTA\" in the last 72 hours.  BNP: No results for input(s): \"PROBNP\" in the last 72 hours.  UA:  Lab Results   Component Value Date/Time    NITRU Negative 03/02/2025 07:28 PM    COLORU DARK YELLOW 03/02/2025 07:28 PM    PHUR 7.5 03/02/2025 07:28 PM    PHUR 5.5 09/18/2023 10:32 PM    WBCUA 110 03/02/2025 07:28 PM    RBCUA 12 03/02/2025 07:28 PM    BACTERIA 4+ 03/02/2025 07:28 PM    CLARITYU TURBID 03/02/2025 07:28 PM    LEUKOCYTESUR LARGE 03/02/2025 07:28 PM    UROBILINOGEN 1.0 03/02/2025 07:28 PM    BILIRUBINUR SMALL 03/02/2025 07:28 PM    BLOODU SMALL 03/02/2025 07:28 PM    GLUCOSEU Negative 03/02/2025 07:28 PM    KETUA TRACE 03/02/2025 07:28 PM     Urine Cultures:   Lab Results   Component Value Date/Time    LABURIN >100,000 CFU/ml  Sensitivity to follow   03/02/2025 07:28 PM     Blood Cultures: No results found for: \"BC\"  No results found for: \"BLOODCULT2\"  Organism:   Lab Results   Component Value Date/Time    ORG Klebsiella pneumoniae 03/02/2025 07:28 PM         Electronically signed by ARIES MAY MD on 3/4/2025 at 8:30 AM

## 2025-03-04 NOTE — CONSULTS
In patient Neurology consult        [facility]      MD Yelitza Jonesdanielle KhanIda  1940    Date of Service: 3/4/2025    Referring Physician: Penny Han MD    Most of the history was obtained from detailed chart reviewing and discussion with the patient's family. The patient is currently confused and unable to provide me with accurate history.     Reason for the consult and CC: Acute encephalopathy and abnormal CT    HPI:   The patient is a 84 y.o.  years old female with history of dementia, CVA, A-fib and other medical problems who was admitted to the hospital 2 days ago with acute encephalopathy and syncope.  She is a nursing home resident.  Onset on day of admission.  Description brief LOC for few minutes.  No witnessed seizure-like activity or postictal state.  Degree was severe.  No falling.  Patient does have some bradycardia.  She came to the ED where she was admitted.  Seen by cardiology and psychiatry.  Further imaging with CT showed increased white matter hypodensity and concern for stroke.  Neurology was consulted.  Today she is waxing and waning but better than yesterday.  Discussed with her daughter.  Baseline dementia.  Other review of system was unremarkable.        I personally reviewed and updated social history, past medical history, medications, allergy, surgical history, and family history as documented in the patient's electronic health records.       ROS: 10-14 system review, reviewed with patient's family and/or nurse was unremarkable except mentioned in HPI.     Constitutional:   Vitals:    03/03/25 1930 03/04/25 0016 03/04/25 0415 03/04/25 0848   BP: (!) 149/83 (!) 136/97 (!) 157/79 124/85   Pulse: 96 83 77 91   Resp: 15 16 16 16   Temp: 97.2 °F (36.2 °C) 97.1 °F (36.2 °C) 97.3 °F (36.3 °C) 97.8 °F (36.6 °C)   TempSrc: Axillary Axillary Axillary Temporal   SpO2: 99% 91% 98% 97%   Weight:       Height:          General appearance: Confused

## 2025-03-04 NOTE — PLAN OF CARE
Problem: Safety - Adult  Goal: Free from fall injury  3/4/2025 0219 by Michelle Mendoza RN  Outcome: Progressing  3/3/2025 1444 by Enma Gamez RN  Outcome: Progressing     Problem: Chronic Conditions and Co-morbidities  Goal: Patient's chronic conditions and co-morbidity symptoms are monitored and maintained or improved  3/4/2025 0219 by Michelle Mendoza RN  Outcome: Progressing  3/3/2025 1444 by Enma Gamez RN  Outcome: Progressing     Problem: Discharge Planning  Goal: Discharge to home or other facility with appropriate resources  3/4/2025 0219 by Michelle Mendoza RN  Outcome: Progressing  3/3/2025 1444 by Enma Gamez RN  Outcome: Progressing     Problem: Skin/Tissue Integrity  Goal: Skin integrity remains intact  Description: 1.  Monitor for areas of redness and/or skin breakdown  2.  Assess vascular access sites hourly  3.  Every 4-6 hours minimum:  Change oxygen saturation probe site  4.  Every 4-6 hours:  If on nasal continuous positive airway pressure, respiratory therapy assess nares and determine need for appliance change or resting period  3/4/2025 0219 by Michelle Mendoza RN  Outcome: Progressing  Flowsheets (Taken 3/3/2025 2000)  Skin Integrity Remains Intact: Monitor for areas of redness and/or skin breakdown  3/3/2025 1444 by Enma Gamez RN  Outcome: Progressing     Problem: Confusion  Goal: Confusion, delirium, dementia, or psychosis is improved or at baseline  Description: INTERVENTIONS:  1. Assess for possible contributors to thought disturbance, including medications, impaired vision or hearing, underlying metabolic abnormalities, dehydration, psychiatric diagnoses, and notify attending LIP  2. [place] high risk fall precautions, as indicated  3. Provide frequent short contacts to provide reality reorientation, refocusing and direction  4. Decrease environmental stimuli, including noise as appropriate  5. Monitor and intervene to maintain adequate nutrition, hydration,

## 2025-03-04 NOTE — PROGRESS NOTES
Physical/Occupational Therapy  Ness Prieto    Orders received for PT/OT evaluation. Chart reviewed. Pt previously evaluated by therapy services 5 years ago. Patient LTC resident, utilizes mechanical lift for transfers and needs assist for ADLs at baseline. No further acute therapy needs identified at this time.    Ava Bright PT, DPT [id number]   Anahi Carrasquillo OTR/L [id number]

## 2025-03-04 NOTE — CONSULTS
EMR.  She has an active health care agent at this time.     1. Goals of medical treatment were reviewed .   2. Patient's stated goal/treatment preference is Dull Code.  3. Others present during the discussion FRANKI Cedeño   4. The discussion lasted 25 minutes.  5. I will notify physician of change in care plan.    Bernarda Mirza RN  3/4/2025     Problem Severity: Pain/Other Symptoms:     Bilateral AKA. Uses electric WC at home facility.  Confused. UTI present. Infectious Disease consulted.     Bed Mobility/Toileting/Transfer:   Moderate assistance OOB to WC with mechanical lyft.       Performance Status:        Palliative Performance Scale:  100% []Full Normal activity & work No evidence of disease  90%   [] Full Normal activity & work Some evidence of disease  80%   [] Full Normal activity with Effort Some evidence of disease  70%   [] Reduced Unable Normal Job/Work Significant disease Full Normal or reduced  60%   [] Ambulation reduced; Significant disease; Can't do hobbies/housework; intake normal   or reduced; occasional assist; LOC full/confusion  50%   [] Mainly sit/lie; Extensive disease; Can't do any work; Considerable assist; intake normal  Or reduced; LOC full/confusion  40%   [x] Mainly in bed; Extensive disease; Mainly assist; intake normal or reduced; occasional assist; LOC full/confusion  30%   [] Bed Bound; Extensive disease; Total care; intake reduced; LOC full/confusion  20%   [] Bed Bound; Extensive disease; Total care; intake minimal; Drowsy/coma  10%   [] Bed Bound; Extensive disease; Total care; Mouth care only; Drowsy/coma    PPS 40%    Symptom Assessment: Appetite/Nausea/Bowels/Fatigue:        WT 98 lbs. Albumin 3.3  Frail appearance.     Social History:   reports that she has never smoked. She has never used smokeless tobacco. She reports that she does not drink alcohol and does not use drugs.    Family History:  family history is not on file.    Psychological/Spiritual:  Anabaptist.

## 2025-03-04 NOTE — ACP (ADVANCE CARE PLANNING)
Advance Care Planning     Palliative Team Advance Care Planning (ACP) Conversation    Date of Conversation: 03/04/25    Individuals present for the conversation: Patient and Legal healthcare agent named below     ACP documents on file prior to discussion:  -Power of  for Healthcare    Previously completed document/s not on file:  ACP document was completed previously but it is not available for review. This writer requested a copy of the document for patient's chart.     Healthcare Decision Maker:    Primary Decision Maker: Gala Prieto (POA) - Grandchild - [phone]     Conversation Summary:  I discussed Advance Care Planning with Ness Prieto and granddaughter Chanel (DPOA) today which included the importance of making their choices for care and treatment in the case of a health event that adversely affects their decision-making abilities. She has completed the Advance Care Directives and was provided a copy during ED admission process. Pending scanning into EMR.  She has an active health care agent at this time.      1. Goals of medical treatment were reviewed .   2. Patient's stated goal/treatment preference is Dull Code.  3. Others present during the discussion FRANKI Gala   4. The discussion lasted 25 minutes.  5. I will notify physician of change in care plan.     Education on palliative vs hospice philosophy reviewed. Granddaughter expresses understanding of information shared. Overall goal when medically clear is to return to [facility]. Wishes remain as Full code and in agreement for all aggressive interventions at this time.     Resuscitation Status:   Code Status: Full Code     Documentation Completed:  -Patient/surrogate was asked to submit existing ACP documents for our records    I spent 25 minutes with the patient and/or surrogate decision maker discussing the patient's wishes and goals.      Bernarda Mirza RN

## 2025-03-04 NOTE — PROGRESS NOTES
[facility]   EP Progress Note     Date: 3/4/2025  Admit Date: 3/2/2025     Reason for consultation: Bradycardia    Chief Complaint:   Chief Complaint   Patient presents with    Bradycardia     Brought in by EMS from [facility] for bradycardia and hypoxia. Per facility patient recently started on amlodipine 10 mg on 2/28.  Patient bilateral above the knee amputee. History of dementia (mostly non-verbal), CAD, and CVA.        History of Present Illness: History obtained from patient and medical record.     Ness Prieto is a 84 y.o. female with a past medical history of  Alzheimer's dementia, Longstanding atrial  fibrillation hx DCCV 10/2020, hypertension, CVA, peripheral vascular disease with bilateral AKA's, anemia s/p watchman implant 5/2021 presented to the hospital from nursing facility for concerns of altered mental status. She was found to be in a junctional rhythm upon arrival and EP has been consulted. She was recently started on amlodipine.      Patient has history of  since at least 2017. Pulse normally runs in the 60's. She follows at . S/P watchman device 5/2021. Device is well seated without concern for leak verified on RIMMA 6/2021.     Interval Hx: Today, she is being seen for follow up.     Patient seen and examined. Clinical notes reviewed. Telemetry reviewed.  No new complaints today. No major events overnight.   Denies having chest pain, palpitations, shortness of breath, orthopnea/PND, cough, or dizziness at the time of this visit.    Assessment and Plan:     1.Bradycardia/Junctional Rhythm   - Resolved. Likely due to BB/CCB combination. She is back in atrial fibrillation with her HR in the 70-80s    2. Permanent Atrial Fibrillation  - Hx of FLORENCIO closure with Watchman (5/2021)   ~ Continue ASA daily. No need for anticoagulation  - Currently in AF  - No rate/rhythm control meds due to above. Recommend resuming cardizem 120 mg daily today  - She is not a candidate for EP procedures due to  precautions 03/03/2025    History of ESBL Klebsiella pneumoniae infection 03/03/2025    Altered mental status 03/03/2025    Sepsis (HCC) 03/03/2025    Permanent atrial fibrillation (HCC) 03/03/2025    Loss of consciousness (HCC) 03/02/2025    Facial droop 09/18/2023    Urinary tract infection without hematuria 12/03/2017    Wrist drop, acquired, right 12/03/2017    Benign essential HTN 12/03/2017    Chronic atrial fibrillation (HCC) 12/03/2017    Hyponatremia 12/02/2017        Multiple medical conditions with risk of decompensation.     All pertinent information and plan of care discussed with the EP physician. Case discussed with hospitalist provider    All questions and concerns were addressed to the patient. Alternatives to my treatment were discussed. I have discussed the above stated plan with patient and the nurse. The patient verbalized understanding and agreed with the plan.    Thank you for allowing to us to participate in the care of Ness Prieto    LETTY Young-CNP  [facility]   Office: [phone]

## 2025-03-04 NOTE — PROGRESS NOTES
Speech Language Pathology  [facility]  [phone]  SLP Dysphagia Treatment       Patient: Ness Prieto   : 1940   MRN: [id number]      Evaluation Date: 3/4/2025      Admitting Dx: Loss of consciousness (HCC) [R40.20]  Junctional bradycardia [R00.1]  Acute cystitis without hematuria [N30.00]  Treatment Diagnosis: Oropharyngeal Dysphagia   Pain: Denies                                  Recommendations      Recommended Diet and Intervention 3/4/2025:  Diet Solids Recommendation:  Dysphagia II Minced and Moist   Liquid Consistency Recommendation:  Thin liquids  Recommended form of Meds: Meds in puree  or Meds crushed as able in puree          Compensatory strategies: Alternate solids/liquids , Check for pocketing of food L, Check for pocketing of food R, Upright as possible with all PO intake , Assist Feed , Small bites/sips , Remain upright 30-45 min , Aspiration Precautions     Discharge Recommendations:  Discharge recommendations to be determined pending ongoing follow-up during acute care stay    History/Course of Treatment     H&P: Ness Prieto is a 84 y.o. female with a pmh of Alzheimer's dementia, [facility] resident, bilateral AKA, hypertension, chronic A-fib.  She was found unresponsive and sweaty at the nursing home requiring her ED presentation.  Alert and interactive on ED presentation.  But noted with junctional bradycardia.  She has been admitted for further management.      Imaging:  Chest X-ray:   IMPRESSION:  No acute process.    History/Prior Level of Function:   Living Status: lives in a skilled nursing facility  Prior Dysphagia History: Per hard chart, pt on a Dysphagia II Minced and Moist with thin liquids at her ECF. Previously evaluated here 2023 with recommendation for Dysphagia III Soft and Bite-Sized with thin liquids. Pt with hx of dementia.     Reason for referral: SLP evaluation orders received due to altered mental status and

## 2025-03-05 ENCOUNTER — APPOINTMENT (OUTPATIENT)
Dept: MRI IMAGING | Age: 85
DRG: 045 | End: 2025-03-05
Payer: MEDICAID

## 2025-03-05 LAB
ANION GAP SERPL CALCULATED.3IONS-SCNC: 9 MMOL/L (ref 3–16)
BASOPHILS # BLD: 0.1 K/UL (ref 0–0.2)
BASOPHILS NFR BLD: 0.9 %
BUN SERPL-MCNC: 6 MG/DL (ref 7–20)
CALCIUM SERPL-MCNC: 8.5 MG/DL (ref 8.3–10.6)
CHLORIDE SERPL-SCNC: 106 MMOL/L (ref 99–110)
CO2 SERPL-SCNC: 24 MMOL/L (ref 21–32)
CREAT SERPL-MCNC: 0.6 MG/DL (ref 0.6–1.2)
DEPRECATED RDW RBC AUTO: 15 % (ref 12.4–15.4)
EOSINOPHIL # BLD: 0.2 K/UL (ref 0–0.6)
EOSINOPHIL NFR BLD: 2.4 %
GFR SERPLBLD CREATININE-BSD FMLA CKD-EPI: 88 ML/MIN/{1.73_M2}
GLUCOSE BLD-MCNC: 105 MG/DL (ref 70–99)
GLUCOSE BLD-MCNC: 78 MG/DL (ref 70–99)
GLUCOSE BLD-MCNC: 90 MG/DL (ref 70–99)
GLUCOSE BLD-MCNC: 96 MG/DL (ref 70–99)
GLUCOSE SERPL-MCNC: 94 MG/DL (ref 70–99)
HCT VFR BLD AUTO: 29.7 % (ref 36–48)
HGB BLD-MCNC: 10 G/DL (ref 12–16)
LYMPHOCYTES # BLD: 1.9 K/UL (ref 1–5.1)
LYMPHOCYTES NFR BLD: 30.3 %
MCH RBC QN AUTO: 30 PG (ref 26–34)
MCHC RBC AUTO-ENTMCNC: 33.7 G/DL (ref 31–36)
MCV RBC AUTO: 89.1 FL (ref 80–100)
MONOCYTES # BLD: 0.8 K/UL (ref 0–1.3)
MONOCYTES NFR BLD: 12.3 %
NEUTROPHILS # BLD: 3.4 K/UL (ref 1.7–7.7)
NEUTROPHILS NFR BLD: 54.1 %
PERFORMED ON: ABNORMAL
PERFORMED ON: NORMAL
PLATELET # BLD AUTO: 244 K/UL (ref 135–450)
PMV BLD AUTO: 8 FL (ref 5–10.5)
POTASSIUM SERPL-SCNC: 4.6 MMOL/L (ref 3.5–5.1)
RBC # BLD AUTO: 3.33 M/UL (ref 4–5.2)
SODIUM SERPL-SCNC: 139 MMOL/L (ref 136–145)
WBC # BLD AUTO: 6.4 K/UL (ref 4–11)

## 2025-03-05 PROCEDURE — 6360000002 HC RX W HCPCS: Performed by: INTERNAL MEDICINE

## 2025-03-05 PROCEDURE — 6370000000 HC RX 637 (ALT 250 FOR IP): Performed by: INTERNAL MEDICINE

## 2025-03-05 PROCEDURE — 6370000000 HC RX 637 (ALT 250 FOR IP): Performed by: PSYCHIATRY & NEUROLOGY

## 2025-03-05 PROCEDURE — 2580000003 HC RX 258: Performed by: INTERNAL MEDICINE

## 2025-03-05 PROCEDURE — G0545 PR INHERENT VISIT TO INPT: HCPCS | Performed by: INTERNAL MEDICINE

## 2025-03-05 PROCEDURE — 2500000003 HC RX 250 WO HCPCS: Performed by: INTERNAL MEDICINE

## 2025-03-05 PROCEDURE — 36415 COLL VENOUS BLD VENIPUNCTURE: CPT

## 2025-03-05 PROCEDURE — 6370000000 HC RX 637 (ALT 250 FOR IP): Performed by: NURSE PRACTITIONER

## 2025-03-05 PROCEDURE — 99233 SBSQ HOSP IP/OBS HIGH 50: CPT | Performed by: PSYCHIATRY & NEUROLOGY

## 2025-03-05 PROCEDURE — APPNB30 APP NON BILLABLE TIME 0-30 MINS

## 2025-03-05 PROCEDURE — 80048 BASIC METABOLIC PNL TOTAL CA: CPT

## 2025-03-05 PROCEDURE — 6360000002 HC RX W HCPCS: Performed by: NURSE PRACTITIONER

## 2025-03-05 PROCEDURE — APPSS30 APP SPLIT SHARED TIME 16-30 MINUTES

## 2025-03-05 PROCEDURE — 99233 SBSQ HOSP IP/OBS HIGH 50: CPT | Performed by: INTERNAL MEDICINE

## 2025-03-05 PROCEDURE — 2500000003 HC RX 250 WO HCPCS: Performed by: NURSE PRACTITIONER

## 2025-03-05 PROCEDURE — 1200000000 HC SEMI PRIVATE

## 2025-03-05 PROCEDURE — 85025 COMPLETE CBC W/AUTO DIFF WBC: CPT

## 2025-03-05 RX ADMIN — Medication 10 ML: at 19:52

## 2025-03-05 RX ADMIN — METOPROLOL TARTRATE 5 MG: 5 INJECTION INTRAVENOUS at 09:06

## 2025-03-05 RX ADMIN — ENOXAPARIN SODIUM 40 MG: 100 INJECTION SUBCUTANEOUS at 08:48

## 2025-03-05 RX ADMIN — MEROPENEM 1000 MG: 1 INJECTION INTRAVENOUS at 05:23

## 2025-03-05 RX ADMIN — ATORVASTATIN CALCIUM 40 MG: 40 TABLET, FILM COATED ORAL at 17:01

## 2025-03-05 RX ADMIN — Medication 1 TABLET: at 08:48

## 2025-03-05 RX ADMIN — ERTAPENEM SODIUM 1000 MG: 1 INJECTION INTRAMUSCULAR; INTRAVENOUS at 17:10

## 2025-03-05 RX ADMIN — RISPERIDONE 0.25 MG: 0.5 TABLET, FILM COATED ORAL at 17:01

## 2025-03-05 RX ADMIN — ASPIRIN 81 MG: 81 TABLET, CHEWABLE ORAL at 08:48

## 2025-03-05 RX ADMIN — DILTIAZEM HYDROCHLORIDE 120 MG: 120 CAPSULE, COATED, EXTENDED RELEASE ORAL at 08:48

## 2025-03-05 NOTE — PLAN OF CARE
Problem: Safety - Adult  Goal: Free from fall injury  Outcome: Progressing     Problem: Chronic Conditions and Co-morbidities  Goal: Patient's chronic conditions and co-morbidity symptoms are monitored and maintained or improved  Outcome: Progressing     Problem: Discharge Planning  Goal: Discharge to home or other facility with appropriate resources  Outcome: Progressing     Problem: Skin/Tissue Integrity  Goal: Skin integrity remains intact  Description: 1.  Monitor for areas of redness and/or skin breakdown  2.  Assess vascular access sites hourly  3.  Every 4-6 hours minimum:  Change oxygen saturation probe site  4.  Every 4-6 hours:  If on nasal continuous positive airway pressure, respiratory therapy assess nares and determine need for appliance change or resting period  Outcome: Progressing     Problem: Confusion  Goal: Confusion, delirium, dementia, or psychosis is improved or at baseline  Description: INTERVENTIONS:  1. Assess for possible contributors to thought disturbance, including medications, impaired vision or hearing, underlying metabolic abnormalities, dehydration, psychiatric diagnoses, and notify attending LIP  2. [place] high risk fall precautions, as indicated  3. Provide frequent short contacts to provide reality reorientation, refocusing and direction  4. Decrease environmental stimuli, including noise as appropriate  5. Monitor and intervene to maintain adequate nutrition, hydration, elimination, sleep and activity  6. If unable to ensure safety without constant attention obtain sitter and review sitter guidelines with assigned personnel  7. Initiate Psychosocial CNS and Spiritual Care consult, as indicated  Outcome: Progressing     Problem: Pain  Goal: Verbalizes/displays adequate comfort level or baseline comfort level  Outcome: Progressing

## 2025-03-05 NOTE — PROGRESS NOTES
Infectious Diseases   Progress Note      Admission Date: 3/2/2025  Hospital Day: Hospital Day: 4   Attending: Penny Han MD  Date of service: 3/5/2025     Chief complaint/ Reason for consult:     Altered mental status on admission  Sepsis on admission with tachypnea, bradycardia and hypotension  ESBL Klebsiella urinary tract infection  Need for contact isolation  History of stroke  Chronic atrial fibrillation  Essential hypertension  Mixed hyperlipidemia    Microbiology:      I have reviewed allavailable micro lab data and cultures    Results       Procedure Component Value Units Date/Time    Culture, Blood 2 [[id number]] Collected: 03/03/25 1308    Order Status: Completed Specimen: Blood Updated: 03/04/25 1415     Culture, Blood 2 No Growth to date.  Any change in status will be called.    Narrative:      ORDER#: [id number]                          ORDERED BY: BRANDEN THOMASON  SOURCE: Blood Hand, Right                  COLLECTED:  03/03/25 13:08  ANTIBIOTICS AT SYD.:                      RECEIVED :  03/03/25 20:42  If child <=2 yrs old please draw pediatric bottle.~Blood Culture #2    Culture, Blood 1 [[id number]] Collected: 03/03/25 1307    Order Status: Completed Specimen: Blood Updated: 03/04/25 1415     Blood Culture, Routine No Growth to date.  Any change in status will be called.    Narrative:      ORDER#: [id number]                          ORDERED BY: BRANDEN THOMASON  SOURCE: Blood Hand, Right                  COLLECTED:  03/03/25 13:07  ANTIBIOTICS AT SYD.:                      RECEIVED :  03/03/25 20:42  If child <=2 yrs old please draw pediatric bottle.~Blood Culture 1    Culture, Urine [[id number]]  (Abnormal)  (Susceptibility) Collected: 03/02/25 1928    Order Status: Completed Specimen: Urine, clean catch Updated: 03/05/25 0659     Organism Klebsiella pneumoniae ESBL     Urine Culture, Routine --     >100,000 CFU/ml  CONTACT PRECAUTIONS INDICATED  Carbapenem antibiotics are the best option for  ([facility] [address]  [facility] days:  Tuesday & Thursday AM    [facility]  [address] , [address] (Medical office Building)  [place], [place], [address]  [facility] days: Wednesday AM

## 2025-03-05 NOTE — PROGRESS NOTES
Ness Prieto  Neurology Follow-up  [facility]    Date of Service: 3/5/2025    Subjective:   CC: Follow up today regarding: Acute encephalopathy and abnormal CT    Events noted. Chart and lab reviewed.  Patient seen this morning she is resting in bed.  MRI brain was attempted yesterday but unable to complete due to patient cooperation.  Discussed with nursing, she continues to wax and wane.  This morning she is able to open eyes but closes immediately.  She remains afebrile.  Other review of system is limited      ROS : A 10-12 system review could not be obtained due to poor cooperation and confusion.       I personally reviewed and updated social history, past medical history, medications, allergy, surgical history, and family history as documented in the patient's electronic health records.          Objective:  Constitutional:   Vitals:    03/05/25 0524 03/05/25 0843 03/05/25 1012 03/05/25 1015   BP: 139/71 (!) 146/72     Pulse: 85 (!) 145  98   Resp: 16 16     Temp: 97.1 °F (36.2 °C) 97.6 °F (36.4 °C)     TempSrc: Temporal Temporal     SpO2: 94% 99%     Weight:       Height:   1.676 m (5' 6\")        General appearance: Confused   Mental Status:   AAO times one.   Attention and concentration: poor, waxing and waning.   Language: Fluent speech.  Does not follow directions  Recent memory: Impaired  Remote memory: Impaired   Poor fund of knowledge  Cranial Nerves:   II: Pupils: equal, round, reactive to light  III,IV,VI: No gaze preference. No nystagmus  V: Facial sensation: Grossly unremarkable.  VII: Facial strength and movements: intact and symmetric  XII: Tongue movements : midline  Musculoskeletal: Able to move bilateral upper extremities spontaneously.  Bilateral AKA  Tone: Normal tone.  No rigidity.  Reflexes: symmetric 2+ in both arms.  Coordination: No abnormal movements, no tremors  Sensation: Withdraws to pain  Gait: Nonambulatory      Data:  LABS:   Lab Results   Component Value  possible  Hydration  Antibiotics  DVT and GI prophylaxis on aspirin 81 mg daily  Lipitor 40 mg daily  PT, OT and speech  Respiratory support  Not candidate for anticoagulation  Blood pressure monitor.  Goal inpatient 140-160  Can be discharged once medically stable giving high risk for hospital-acquired delirium    MDM: High      Electronically signed by Cong Sprague MD on 3/5/25 at 12:40 PM EST       This dictation was generated by voice recognition computer software. Although all attempts are made to edit the dictation for accuracy, there may be errors in the transcription that are not intended.

## 2025-03-05 NOTE — PROGRESS NOTES
Called MRI, was told they would not be able to do another attempt to complete the MRI today. Will call for patient tomorrow.

## 2025-03-05 NOTE — PLAN OF CARE
Problem: Safety - Adult  Goal: Free from fall injury  3/5/2025 0942 by Bridgette Johnson RN  Outcome: Progressing  3/4/2025 2251 by Joe Villatoro RN  Outcome: Progressing     Problem: Chronic Conditions and Co-morbidities  Goal: Patient's chronic conditions and co-morbidity symptoms are monitored and maintained or improved  3/5/2025 0942 by Bridgette Johnson RN  Outcome: Progressing  3/4/2025 2251 by Joe Villatoro RN  Outcome: Progressing     Problem: Discharge Planning  Goal: Discharge to home or other facility with appropriate resources  3/5/2025 0942 by Bridgette Johnson RN  Outcome: Progressing  3/4/2025 2251 by Joe Villatoro RN  Outcome: Progressing     Problem: Skin/Tissue Integrity  Goal: Skin integrity remains intact  Description: 1.  Monitor for areas of redness and/or skin breakdown  2.  Assess vascular access sites hourly  3.  Every 4-6 hours minimum:  Change oxygen saturation probe site  4.  Every 4-6 hours:  If on nasal continuous positive airway pressure, respiratory therapy assess nares and determine need for appliance change or resting period  3/5/2025 0942 by Bridgette Johnson RN  Outcome: Progressing  3/4/2025 2251 by Joe Villatoro RN  Outcome: Progressing     Problem: Confusion  Goal: Confusion, delirium, dementia, or psychosis is improved or at baseline  Description: INTERVENTIONS:  1. Assess for possible contributors to thought disturbance, including medications, impaired vision or hearing, underlying metabolic abnormalities, dehydration, psychiatric diagnoses, and notify attending LIP  2. [place] high risk fall precautions, as indicated  3. Provide frequent short contacts to provide reality reorientation, refocusing and direction  4. Decrease environmental stimuli, including noise as appropriate  5. Monitor and intervene to maintain adequate nutrition, hydration, elimination, sleep and activity  6. If unable to ensure safety without constant attention obtain sitter and  review sitter guidelines with assigned personnel  7. Initiate Psychosocial CNS and Spiritual Care consult, as indicated  3/5/2025 0942 by Bridgette Johnson RN  Outcome: Progressing  3/4/2025 2251 by Joe Villatoro, RN  Outcome: Progressing     Problem: Pain  Goal: Verbalizes/displays adequate comfort level or baseline comfort level  3/5/2025 0942 by Bridgette Johnson RN  Outcome: Progressing  3/4/2025 2251 by Joe Villatoro RN  Outcome: Progressing

## 2025-03-05 NOTE — PROGRESS NOTES
Nutrition Note    RECOMMENDATIONS  PO Diet: Dysphagia minced & moist  ONS: Ensure TID     ASSESSMENT   Consult received for poor po intake/appetite.  Pt is from LT, has dementia & bilateral AKA's.  Receives a dysphagia minced & moist diet, with bkf tray untouched at bedside.  Pt disoriented x4   Has increased nutrient needs d/t stage 2 wound on buttocks.  Per hx, wt trending down, but unable to determine any recent, significant wt loss.  Will offer Ensure supplements to promote wound healing. Will monitor for po & supp intake.       Malnutrition Status: Insufficient data     NUTRITION DIAGNOSIS   Inadequate oral intake related to cognitive or neurological impairment as evidenced by intake 0-25%, other (dementia)  Increased nutrient needs related to increase demand for energy/nutrients as evidenced by wounds    Goals: PO intake 50% or greater     NUTRITION RELATED FINDINGS  Objective: LBM 3/3; labs reviewed, no edema noted  Wounds: Stage II    CURRENT NUTRITION THERAPIES  ADULT DIET; Dysphagia - Minced and Moist       PO Intake: Unable to assess   PO Supplement Intake:None Ordered    ANTHROPOMETRICS  Current Height: 167.6 cm (5' 6\")  Current Weight - Scale: 44.5 kg (98 lb)      COMPARATIVE STANDARDS  Total Energy Requirements (kcals/day): 1335- 1556     Protein (g):  67- 89g       Fluid (mL/day):  1556 ml/day    EDUCATION  Education/Counseling not indicated     The patient will be monitored per nutrition standards of care. Consult dietitian if additional nutrition interventions are needed prior to RD reassessment.     Melony Vences RD, LD    Contact: [phone]

## 2025-03-05 NOTE — DISCHARGE INSTR - COC
Continuity of Care Form    Patient Name: Ness Prieto   :  1940  MRN:  [id number]    Admit date:  3/2/2025  Discharge date:  ***    Code Status Order: Full Code   Advance Directives:   Advance Care Flowsheet Documentation             Admitting Physician:  Xavier Nina MD  PCP: Parish Strickland MD    Discharging Nurse: ***  Discharging Hospital Unit/Room#: 4TN-4466/4466-01  Discharging Unit Phone Number: ***    Emergency Contact:   Extended Emergency Contact Information  Primary Emergency Contact: Gala Prieto (POA)  Address:  [address]  Home Phone: [phone]  Mobile Phone: [phone]  Relation: Grandchild    Past Surgical History:  Past Surgical History:   Procedure Laterality Date    ABOVE KNEE AMPUTATION      left       Immunization History:   There is no immunization history for the selected administration types on file for this patient.    Active Problems:  Patient Active Problem List   Diagnosis Code    Hyponatremia E87.1    Urinary tract infection due to ESBL Klebsiella N39.0, B96.89    Wrist drop, acquired, right M21.331    HTN (hypertension), benign I10    Chronic atrial fibrillation (HCC) I48.20    Facial droop R29.810    Loss of consciousness (HCC) R40.20    Junctional bradycardia R00.1    Mixed hyperlipidemia E78.2    History of stroke Z86.73    Peripheral vascular disease I73.9    Infection requiring contact isolation precautions B99.9    History of ESBL Klebsiella pneumoniae infection Z86.19    Altered mental status R41.82    Sepsis (HCC) A41.9    Permanent atrial fibrillation (HCC) I48.21    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.9, F02.80    Nonrheumatic mitral valve regurgitation I34.0    Acute encephalopathy G93.40    Syncope and collapse R55    Acute cystitis without hematuria N30.00       Isolation/Infection:   Isolation            Contact          Patient Infection Status       Infection Onset Added Last  given verbal informed consent for [facility] ID pharmacist, Mercy Thomson to manage above antibiotic therapy for the patient  and for OPAT RN coordinator, Tamiko Gamez to call/contact the patient as needed to coordinate care and antibiotic/lab/symptoms monitoring after the patient's discharge from the hospital. Please reach out to ID office at [phone] or consult in-house ID team for further recommendations during any future hospitalizations before completion of above IV antibiotic course.        ** Please notify Vidal Thomason MD's office with any change in patient's        status, transfer out of facility or to hospital by calling [phone]           Outpatient Follow up:  No routine outpatient ID f/u needed at this time, if the patient continues to do well.  If need arises, a f/u in-person or video visit can be arranged via my office at patient's request on as-needed basis.           Physician Signature:  Electronically signed by Vidal Thomason MD on                                                      3/5/25 at 3:21 PM EST      (ABOVE ARE DR. THOMASON'S DIGITAL SIGNATURES & DATE/TIME STAMPS. THESE SHOULD NOT BE TAMPERED WITH BY ANYONE AT ANY TIME)          Physician Certification: I certify the above information and transfer of Ness Prieto  is necessary for the continuing treatment of the diagnosis listed and that she requires Skilled Nursing Facility for less 30 days.     Update Admission H&P: No change in H&P    PHYSICIAN SIGNATURE:  Electronically signed by ARIES AMY MD on 3/8/25 at 9:11 AM EST

## 2025-03-05 NOTE — PROGRESS NOTES
Shift assessment completed. HR elevated and ranging between 110-150's. Medications given per MAR, prn metoprolol also given. Patient currently refusing to eat breakfast. Fall precautions in place. Call light in reach.

## 2025-03-05 NOTE — PROGRESS NOTES
V2.0    [facility] Progress Note      Name:  Ness Prieto /Age/Sex: 1940  (84 y.o. female)   MRN & CSN:  [id number] & [id number] Encounter Date/Time: 3/5/2025 6:06 PM EST   Location:  [facility]4466/4466-01 PCP: Parish Strickland MD     Attending:Penny Han MD       Hospital Day: 4    Brief course   This 84 y.o. female with PMHx of of Alzheimer's dementia, hx CVA, recurrent UTIs, MDRO, [facility] resident, bilateral AKA, hypertension, anemia, chronic A-fib, s/p watchman implant who was found unresponsive and sweaty at the nursing home.  Pt reportedly alert and interactive on ED presentation.  EKG on admission showed junctional rhythm with bradycardia.       Interval history:  Pt seen and examined today.  Overnight events noted, interval ancillary notes and labs reviewed.   On room air satting well.  Well overnight, WBCs WNL.  Blood cultures NGTD  Per RN report, patient was more awake this morning and was able obtain MRI brain eat a little  Prior to obtain MRI brain but patient refused  Patient was resting in bed with eyes closed.  Limited ROS as patient continues to wax and wane  Patient's daughter wants patient to remain full code        Assessment and plan:    Acute encephalopathy likely delirium in setting of UTI/hospitalization/recent bradycardia episode.  Patient with known Hx of Alzheimer disease  Psychiatry consulted.  Depakote sprinkle and olanzapine DC'd.  Switch to risperidone 0.25 mg nightly.  Continue as needed olanzapine 2.5 mg IM twice daily.  CT head showed interval progression of periventricular, deep and subcortical white matter hypodensity within left parietal and occipital lobe compared to CT .  MRI brain recommended.    ESBL Klebsiella UTI; Urine culture grew> 100% Klebsiella pneumonia  ID on board; ON IV meropenem per the recs    Loss of consciousness: ?  Due to bradycardia; resolved  Follow-up CT head, folate, B12 nd ammonia level.    Bradycardia: Resolved. Junctional rhythm on  04:53 AM     Troponin: No results found for: \"TROPONINT\"  Lactic Acid: No results for input(s): \"LACTA\" in the last 72 hours.  BNP: No results for input(s): \"PROBNP\" in the last 72 hours.  UA:  Lab Results   Component Value Date/Time    NITRU Negative 03/02/2025 07:28 PM    COLORU DARK YELLOW 03/02/2025 07:28 PM    PHUR 7.5 03/02/2025 07:28 PM    PHUR 5.5 09/18/2023 10:32 PM    WBCUA 110 03/02/2025 07:28 PM    RBCUA 12 03/02/2025 07:28 PM    BACTERIA 4+ 03/02/2025 07:28 PM    CLARITYU TURBID 03/02/2025 07:28 PM    LEUKOCYTESUR LARGE 03/02/2025 07:28 PM    UROBILINOGEN 1.0 03/02/2025 07:28 PM    BILIRUBINUR SMALL 03/02/2025 07:28 PM    BLOODU SMALL 03/02/2025 07:28 PM    GLUCOSEU Negative 03/02/2025 07:28 PM    KETUA TRACE 03/02/2025 07:28 PM     Urine Cultures:   Lab Results   Component Value Date/Time    LABURIN  03/02/2025 07:28 PM     >100,000 CFU/ml  CONTACT PRECAUTIONS INDICATED  Carbapenem antibiotics are the best option for infections caused  by ESBL producing organisms.  Other antibiotic classes are  likely to result in treatment failure, even for organisms  demonstrating in vitro susceptibility.       Blood Cultures:   Lab Results   Component Value Date/Time    BC  03/03/2025 01:07 PM     No Growth to date.  Any change in status will be called.     Lab Results   Component Value Date/Time    BLOODCULT2  03/03/2025 01:08 PM     No Growth to date.  Any change in status will be called.     Organism:   Lab Results   Component Value Date/Time    ORG Klebsiella pneumoniae ESBL 03/02/2025 07:28 PM         Electronically signed by ARIES MAY MD on 3/5/2025 at 8:58 AM

## 2025-03-06 ENCOUNTER — APPOINTMENT (OUTPATIENT)
Dept: MRI IMAGING | Age: 85
DRG: 045 | End: 2025-03-06
Payer: MEDICAID

## 2025-03-06 LAB
ANION GAP SERPL CALCULATED.3IONS-SCNC: 9 MMOL/L (ref 3–16)
BACTERIA UR CULT: ABNORMAL
BASOPHILS # BLD: 0.1 K/UL (ref 0–0.2)
BASOPHILS NFR BLD: 0.7 %
BUN SERPL-MCNC: 6 MG/DL (ref 7–20)
CALCIUM SERPL-MCNC: 8.8 MG/DL (ref 8.3–10.6)
CHLORIDE SERPL-SCNC: 103 MMOL/L (ref 99–110)
CO2 SERPL-SCNC: 26 MMOL/L (ref 21–32)
CREAT SERPL-MCNC: 0.5 MG/DL (ref 0.6–1.2)
DEPRECATED RDW RBC AUTO: 14.8 % (ref 12.4–15.4)
EOSINOPHIL # BLD: 0.1 K/UL (ref 0–0.6)
EOSINOPHIL NFR BLD: 2.1 %
GFR SERPLBLD CREATININE-BSD FMLA CKD-EPI: >90 ML/MIN/{1.73_M2}
GLUCOSE BLD-MCNC: 121 MG/DL (ref 70–99)
GLUCOSE BLD-MCNC: 54 MG/DL (ref 70–99)
GLUCOSE BLD-MCNC: 76 MG/DL (ref 70–99)
GLUCOSE BLD-MCNC: 86 MG/DL (ref 70–99)
GLUCOSE BLD-MCNC: 91 MG/DL (ref 70–99)
GLUCOSE SERPL-MCNC: 94 MG/DL (ref 70–99)
HCT VFR BLD AUTO: 29 % (ref 36–48)
HGB BLD-MCNC: 10.1 G/DL (ref 12–16)
LYMPHOCYTES # BLD: 1.8 K/UL (ref 1–5.1)
LYMPHOCYTES NFR BLD: 26.8 %
MCH RBC QN AUTO: 30.1 PG (ref 26–34)
MCHC RBC AUTO-ENTMCNC: 34.6 G/DL (ref 31–36)
MCV RBC AUTO: 87 FL (ref 80–100)
MONOCYTES # BLD: 1.1 K/UL (ref 0–1.3)
MONOCYTES NFR BLD: 15.5 %
NEUTROPHILS # BLD: 3.8 K/UL (ref 1.7–7.7)
NEUTROPHILS NFR BLD: 54.9 %
ORGANISM: ABNORMAL
PERFORMED ON: ABNORMAL
PERFORMED ON: ABNORMAL
PERFORMED ON: NORMAL
PLATELET # BLD AUTO: 235 K/UL (ref 135–450)
PMV BLD AUTO: 8.3 FL (ref 5–10.5)
POTASSIUM SERPL-SCNC: 3.7 MMOL/L (ref 3.5–5.1)
RBC # BLD AUTO: 3.33 M/UL (ref 4–5.2)
SODIUM SERPL-SCNC: 138 MMOL/L (ref 136–145)
WBC # BLD AUTO: 6.9 K/UL (ref 4–11)

## 2025-03-06 PROCEDURE — 85025 COMPLETE CBC W/AUTO DIFF WBC: CPT

## 2025-03-06 PROCEDURE — 99231 SBSQ HOSP IP/OBS SF/LOW 25: CPT | Performed by: PSYCHIATRY & NEUROLOGY

## 2025-03-06 PROCEDURE — 2580000003 HC RX 258: Performed by: INTERNAL MEDICINE

## 2025-03-06 PROCEDURE — 6360000002 HC RX W HCPCS: Performed by: INTERNAL MEDICINE

## 2025-03-06 PROCEDURE — 36415 COLL VENOUS BLD VENIPUNCTURE: CPT

## 2025-03-06 PROCEDURE — 80048 BASIC METABOLIC PNL TOTAL CA: CPT

## 2025-03-06 PROCEDURE — G0545 PR INHERENT VISIT TO INPT: HCPCS | Performed by: INTERNAL MEDICINE

## 2025-03-06 PROCEDURE — 99233 SBSQ HOSP IP/OBS HIGH 50: CPT | Performed by: PSYCHIATRY & NEUROLOGY

## 2025-03-06 PROCEDURE — APPNB30 APP NON BILLABLE TIME 0-30 MINS

## 2025-03-06 PROCEDURE — APPSS30 APP SPLIT SHARED TIME 16-30 MINUTES

## 2025-03-06 PROCEDURE — 2500000003 HC RX 250 WO HCPCS: Performed by: PSYCHIATRY & NEUROLOGY

## 2025-03-06 PROCEDURE — 1200000000 HC SEMI PRIVATE

## 2025-03-06 PROCEDURE — 92526 ORAL FUNCTION THERAPY: CPT

## 2025-03-06 PROCEDURE — 6360000002 HC RX W HCPCS: Performed by: PSYCHIATRY & NEUROLOGY

## 2025-03-06 PROCEDURE — 70551 MRI BRAIN STEM W/O DYE: CPT

## 2025-03-06 PROCEDURE — 99232 SBSQ HOSP IP/OBS MODERATE 35: CPT | Performed by: INTERNAL MEDICINE

## 2025-03-06 RX ADMIN — ERTAPENEM SODIUM 1000 MG: 1 INJECTION INTRAMUSCULAR; INTRAVENOUS at 17:49

## 2025-03-06 RX ADMIN — SODIUM CHLORIDE, SODIUM LACTATE, POTASSIUM CHLORIDE, CALCIUM CHLORIDE AND DEXTROSE MONOHYDRATE: 5; 600; 310; 30; 20 INJECTION, SOLUTION INTRAVENOUS at 20:02

## 2025-03-06 RX ADMIN — DEXTROSE 125 ML: 10 SOLUTION INTRAVENOUS at 12:27

## 2025-03-06 RX ADMIN — SODIUM CHLORIDE, SODIUM LACTATE, POTASSIUM CHLORIDE, CALCIUM CHLORIDE AND DEXTROSE MONOHYDRATE: 5; 600; 310; 30; 20 INJECTION, SOLUTION INTRAVENOUS at 01:15

## 2025-03-06 RX ADMIN — WATER 2.5 MG: 1 INJECTION INTRAMUSCULAR; INTRAVENOUS; SUBCUTANEOUS at 07:57

## 2025-03-06 ASSESSMENT — PAIN SCALES - PAIN ASSESSMENT IN ADVANCED DEMENTIA (PAINAD)
CONSOLABILITY: NO NEED TO CONSOLE
BREATHING: NORMAL
BODYLANGUAGE: RELAXED
BODYLANGUAGE: RELAXED
BREATHING: NORMAL
TOTALSCORE: 0
FACIALEXPRESSION: SMILING OR INEXPRESSIVE
TOTALSCORE: 0
FACIALEXPRESSION: SMILING OR INEXPRESSIVE
CONSOLABILITY: NO NEED TO CONSOLE

## 2025-03-06 ASSESSMENT — PAIN SCALES - WONG BAKER
WONGBAKER_NUMERICALRESPONSE: NO HURT
WONGBAKER_NUMERICALRESPONSE: NO HURT

## 2025-03-06 ASSESSMENT — PAIN SCALES - GENERAL
PAINLEVEL_OUTOF10: 0
PAINLEVEL_OUTOF10: 0

## 2025-03-06 NOTE — CARE COORDINATION
Wound Referral Progress Note       NAME:  Ness Prieto  MEDICAL RECORD NUMBER:  [id number]  AGE: 84 y.o.   GENDER: female  : 1940  TODAY'S DATE:  3/6/2025    Subjective   Reason for WOCN Evaluation and Assessment: wounds present on admission      Ness Prieto is a 84 y.o. 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)     Acute cognitive decline     Acute kidney failure     Alzheimer's disease (HCC)     Anemia     Atherosclerotic cardiovascular disease     Atrial fibrillation (HCC)     Caloric malnutrition     Cerebral infarction due to embolism (HCC)     Dementia (HCC)     Difficulty in walking     Dysphagia     Generalized muscle weakness     Hyperlipidemia     Hypertension     Other symbolic dysfunctions (CODE)     Peripheral vascular disease     Primary generalized (osteo)arthritis        PAST SURGICAL HISTORY    Past Surgical History:   Procedure Laterality Date    ABOVE KNEE AMPUTATION      left       FAMILY HISTORY    No family history on file.    SOCIAL HISTORY    Social History     Tobacco Use    Smoking status: Never    Smokeless tobacco: Never   Substance Use Topics    Alcohol use: No    Drug use: No       ALLERGIES    Allergies   Allergen Reactions    Penicillins Hives and Itching       MEDICATIONS    No current facility-administered medications on file prior to encounter.     Current Outpatient Medications on File Prior to Encounter   Medication Sig Dispense Refill    Nutritional Supplements (ENSURE CLEAR) LIQD Take 240 mLs by mouth 3 times daily       fosfomycin tromethamine (MONUROL) 3 g PACK Take 1 packet by mouth See Admin Instructions Give 1 packet by mouth at bedtime every 10 days for UTI prevention. Mix in 6-8 ounces of water.      Multiple Vitamins-Minerals (THERAPEUTIC MULTIVITAMIN-MINERALS) tablet Take 1 tablet by mouth daily      sennosides-docusate sodium (SENOKOT-S) 8.6-50 MG tablet Take 1 tablet by mouth 2 times daily      dilTIAZem (DILACOR XR) 240 MG extended release capsule Take 1 capsule by mouth daily      divalproex (DEPAKOTE) 125 MG DR tablet Take 1 tablet by mouth in the morning and at bedtime      OLANZapine (ZYPREXA) 2.5 MG tablet Take 1 tablet by mouth nightly      metoprolol succinate (TOPROL XL) 100 MG extended release tablet Take 1 tablet by mouth daily      estradiol (ESTRACE) 0.1 MG/GM vaginal cream Place 1 g vaginally nightly      potassium chloride (KLOR-CON M) 20 MEQ extended release tablet Take 1 tablet by mouth daily      melatonin 3 MG TABS tablet Take 2 tablets by mouth nightly      amLODIPine (NORVASC) 10 MG tablet Take 1 tablet by mouth daily      aspirin 81 MG chewable tablet Take 1 tablet by mouth daily      atorvastatin (LIPITOR) 40 MG tablet Take 1 tablet by mouth nightly      bisacodyl (DULCOLAX) 10 MG suppository Place 1 suppository rectally daily as needed for Constipation (Patient not taking: Reported on 3/2/2025)      magnesium hydroxide (MILK OF MAGNESIA) 400 MG/5ML suspension Take 30 mLs by mouth daily as needed for Constipation      acetaminophen (TYLENOL) 325 MG tablet Take 2 tablets by mouth every 6 hours as needed for Pain         Objective    BP (!) 145/75   Pulse 83   Temp 97.3 °F (36.3 °C) (Axillary)   Resp 16   Ht 1.676 m (5' 6\")   Wt 44.5 kg (98 lb)   SpO2 97%   BMI 15.82 kg/m²     LABS:  WBC:    Lab Results   Component Value Date/Time    WBC 6.9 03/06/2025 04:48 AM     H/H:    Lab Results   Component Value Date/Time    HGB 10.1 03/06/2025 04:48 AM    HCT 29.0 03/06/2025 04:48 AM     PTT:    Lab

## 2025-03-06 NOTE — CARE COORDINATION
Discharge Planning:     (CM) did receive a call from Lelia, [phone], [facility].    Lelia stated that their DON had a conversation this morning with granddaughter of patient who stated that no one from hospital has called her to tell her that her grandmother (patient) is pocketing food or is a risk for aspiration.     Lelia did state their DON was not willing to accept patient back into LTC, at this time, unless granddaughter is willing to change code status to DNRCC.    CM team following.    Electronically signed by TIERRA Teran on 3/6/2025 at 4:13 PM

## 2025-03-06 NOTE — DISCHARGE INSTRUCTIONS
Healing wounds to buttocks and ischium area. Cleanse skin with dial soap and water. Apply zinc paste twice a day and as needed. Pressure relieving measures; please assist patient to reposition every two hours while in bed and every one hour while up in chair.   Please consider following up in the wound center:  [facility]  [address]  [place], [place] [address]  Tel: [phone]  Hours:   Monday- Thursday 8am-4pm,   Friday_ 8am to 1pm  Closed Saturday and Sunday

## 2025-03-06 NOTE — PROGRESS NOTES
Ness Prieto  Neurology Follow-up  [facility] Neurology    Date of Service: 3/6/2025    Subjective:   CC: Follow up today regarding: Acute encephalopathy and abnormal CT    Events noted. Chart and lab reviewed.  Patient seen this morning, was able to have MRI brain this morning.  MRI brain did show small acute left parietal stroke.  She continues to wax and wane.  She did receive Zyprexa prior to MRI. Review of system is limited      ROS : A 10-12 system review could not be obtained due to poor cooperation and confusion.       I personally reviewed and updated social history, past medical history, medications, allergy, surgical history, and family history as documented in the patient's electronic health records.          Objective:  Constitutional:   Vitals:    03/05/25 1950 03/06/25 0037 03/06/25 0531 03/06/25 0800   BP: (!) 147/83 (!) 156/83 (!) 156/80 (!) 186/89   Pulse: (!) 105 86 77 87   Resp: 16 16 16 16   Temp: 99 °F (37.2 °C) 97.4 °F (36.3 °C) 97.6 °F (36.4 °C) 97.3 °F (36.3 °C)   TempSrc: Temporal Temporal Temporal Axillary   SpO2: 97% 99% 96% 98%   Weight:       Height:           General appearance: Confused   Mental Status:   AAO times one.   Attention and concentration: poor, waxing and waning. Eye open briefly but close immediately  Language: Fluent nonsensical speech.  Does not follow directions  Recent memory: Impaired  Remote memory: Impaired   Poor fund of knowledge  Cranial Nerves:   II: Pupils: equal, round, reactive to light  III,IV,VI: No gaze preference. No nystagmus  V: Facial sensation: Grossly unremarkable.  VII: Facial strength and movements: intact and symmetric  XII: Tongue movements : midline  Musculoskeletal: Spontaneous movement of bilateral upper extremities.  History of bilateral AKA  Tone: Normal tone.  No rigidity.  Reflexes: symmetric 2+ in both arms.  Coordination: No abnormal movements, no tremors  Sensation: Withdraws to pain  Gait: Nonambulatory      Data:  LABS:  ID workup  Expect worsening dementia with recurrent stroke  MRI shows significant atrophy and leukoencephalopathy with encephalomalacia  Not candidate for anticoagulation  Telemetry  DVT and GI prophylaxis  Blood pressure control on diltiazem 120 mg daily and avoid blood pressure below 140  Continue Risperdal  Risk of sudden death with antipsychotic medication with dementia  DC planning when medically stable  Nothing to add from neurology at this point  Please call for questions      MDM: High  Poor prognosis      Electronically signed by Cong Sprague MD on 3/6/25 at 1:12 PM EST     This dictation was generated by voice recognition computer software. Although all attempts are made to edit the dictation for accuracy, there may be errors in the transcription that are not intended.

## 2025-03-06 NOTE — PROGRESS NOTES
Speech Language Pathology  [facility]  [phone]  SLP Dysphagia Treatment       Patient: Ness Prieto   : 1940   MRN: [id number]      Evaluation Date: 3/6/2025      Admitting Dx: Loss of consciousness (HCC) [R40.20]  Junctional bradycardia [R00.1]  Acute cystitis without hematuria [N30.00]  Treatment Diagnosis: Oropharyngeal Dysphagia   Pain: Denies                                  Recommendations      Recommended Diet and Intervention 3/6/2025:  Diet Solids Recommendation:  Dysphagia I Puree  - total feed  Liquid Consistency Recommendation:  Thin liquids  Recommended form of Meds: Meds in puree  or Meds crushed as able in puree     1) Pt with waxing and waning levels of alertness and poor PO intake, per chart, pt with dysphagia at baseline. At this time would recommend Dysphagia I Pureed with thin liquids, meds with puree, feed pt when awake and alert. Due to baseline status and increased difficulty would recommend ongoing discussion with palliative care re; goals are care.   2) If pt demonstrates poor tolerance of respiratory status declines would recommend downgrade to NPO pending re-assessment      Compensatory strategies: Alternate solids/liquids , Check for pocketing of food L, Check for pocketing of food R, Upright as possible with all PO intake , Assist Feed , Small bites/sips , Remain upright 30-45 min , Aspiration Precautions     Discharge Recommendations:  Discharge recommendations to be determined pending ongoing follow-up during acute care stay    History/Course of Treatment     H&P: Ness Prieto is a 84 y.o. female with a pmh of Alzheimer's dementia, [facility] resident, bilateral AKA, hypertension, chronic A-fib.  She was found unresponsive and sweaty at the nursing home requiring her ED presentation.  Alert and interactive on ED presentation.  But noted with junctional bradycardia.  She has been admitted for further management.   Puree  with Thin liquids , Meds in puree  or Meds crushed as able in puree  with use of compensatory swallow strategies (see above). Pt a total feed, only feed when awake and alert. May benefit from ongoing discussions with palliative care due to overall clinical picture and new CVA.        Eating Assistance:   Dependent    Assessment: Pt progressing toward goals               Goals     Goals:   Dysphagia Goals: Pt will functionally tolerate ongoing assessment of swallow function with diet to be determined as indicated   Pt will advance to least restrictive diet as indicated     Above goals reviewed on 3/6/2025.  All goals are ongoing at this time unless indicated above.       POC/Education     Dysphagia Therapeutic Intervention:  Patient/Family Education , Therapeutic Trials with SLP     Plan of care: 3-5 times per week during acute care stay.      Education:  Provided education regarding role of SLP, results of assessment, recommendations and general speech pathology plan of care:  No evidence of comprehension    If patient discharges prior to next visit, this note will serve as discharge.     Treatment time:  Timed Code Treatment Minutes: 0   Total Treatment Time Minutes: 15    Electronically signed by:  Toya Perez MA CCC-SLP #[id number]  Speech Language Pathologist

## 2025-03-06 NOTE — CARE COORDINATION
Discharge Planning:     (CM) called Lelia, [phone], [facility], to discuss code status. Lelia informed CM yesterday that their DON is calling daughter to discuss life saving measures as patient is pocketing food and is an aspiration risk.     CM left HIPAA compliant VM for Lelia to call CM back.    CM waiting for return call.    CM team following.    Electronically signed by TIERRA Teran on 3/6/2025 at 8:48 AM

## 2025-03-06 NOTE — PROGRESS NOTES
V2.0    [facility] [place]      Name:  Ness Prieto /Age/Sex: 1940  (84 y.o. female)   MRN & CSN:  [id number] & [id number] Encounter Date/Time: 3/6/2025 6:06 PM EST   Location:  [id number] PCP: Parish Strickland MD     Attending:Penny Han MD       Hospital Day: 5    Brief course   This 84 y.o. female with PMHx of of Alzheimer's dementia, hx CVA, recurrent UTIs, MDRO, [facility] resident, bilateral AKA, hypertension, anemia, chronic A-fib, s/p watchman implant who was found unresponsive and sweaty at the nursing home.  Pt reportedly alert and interactive on ED presentation.  EKG on admission showed junctional rhythm with bradycardia.       Interval history:  Pt seen and examined today.  Overnight events noted, interval ancillary notes and labs reviewed.   On RA satting well.  Afebrile overnight, WBCs WNL.  Cultures NGTD  Elevated BP this morning; instructed RN to give BP meds and recheck blood pressure  Patient continues to pocket food at risk for aspiration.  GI consulted but pt's daughter refused any intervention and stated she only does not she is sick with UTI.  Patient remained about the same.  Alert to self only.  Unable to obtain ROS due to patient's active        Assessment and plan:    Acute encephalopathy likely delirium in setting of UTI/CVA/recent bradycardia episode.  Patient with known Hx of Alzheimer disease  Psychiatry consulted.  Depakote sprinkle and olanzapine DC'd.  Switch to risperidone 0.25 mg nightly.  Continue as needed olanzapine 2.5 mg IM twice daily.    Acute left frontal ischemic stroke; thromboembolic versus cardioembolic  CT head showed interval progression of periventricular, deep and subcortical white matter hypodensity within left parietal and occipital lobe compared to CT .   Neurology on board; patient underwent MRI brain which showed acute 1 cm left parietal stroke.  Acute intracranial hemorrhage.Remote hemorrhagic infarct within right parietal lobe&  bilateral occipital lobes.  OT/speech following.  BP closely.  Avoid BP less than 120. Continue neurochecks.    ESBL Klebsiella UTI; Urine culture grew> 100% Klebsiella pneumonia  ID on board; IV meropenem per the recs    Loss of consciousness: ?  Due to bradycardia; resolved  Folate, B12 nd ammonia level.    Bradycardia: Resolved.  Toprol dose decreased.  Monitor on telemetry    Hx of longstanding A-fib;  s/p Watchman.  Follows with UC.    EP consulted; recommended reducing dose of Toprol-XL to 50 mg daily and Cardizem to 120 mg daily.  Not on anticoagulation.  Continue aspirin    Oropharyngeal dysphagia: SLP evaluation appreciated dysphagia diet    Mild troponin elevation: hs-troponin 19-20-16.    No acute ischemia on EKG.. TTE 3/3/2025: LVEF 60 to 65%.  Normal wall motion.     Hypertension: Continue current regimen and monitor BP closely     Bilateral AKA: Stable.    Anemia: Hgb stable around 10.  Vitamin B12 folic acid WNL.  Iron studies c/w AOCD      Diet ADULT DIET; Dysphagia - Minced and Moist  ADULT ORAL NUTRITION SUPPLEMENT; Breakfast, Lunch, Dinner; Standard High Calorie/High Protein Oral Supplement   DVT Prophylaxis [x] Lovenox, []  Heparin, [] SCDs, [] Ambulation,  [] Eliquis, [] Xarelto  [] Coumadin   Code Status Full Code   Disposition From: ECF  Expected Disposition: ECF  Estimated Date of Discharge: 3/6  Patient requires continued admission due to AMS   Surrogate Decision Maker/ Gala Maguire (SCARLETT) (Grandchild)  [phone]       Objective:   No intake or output data in the 24 hours ending 03/06/25 0936     Vitals:   Vitals:    03/05/25 1950 03/06/25 0037 03/06/25 0531 03/06/25 0800   BP: (!) 147/83 (!) 156/83 (!) 156/80 (!) 186/89   Pulse: (!) 105 86 77 87   Resp: 16 16 16 16   Temp: 99 °F (37.2 °C) 97.4 °F (36.3 °C) 97.6 °F (36.4 °C) 97.3 °F (36.3 °C)   TempSrc: Temporal Temporal Temporal Axillary   SpO2: 97% 99% 96% 98%   Weight:       Height:             Physical Exam:      General

## 2025-03-06 NOTE — PLAN OF CARE
Problem: Safety - Adult  Goal: Free from fall injury  Outcome: Progressing     Problem: Chronic Conditions and Co-morbidities  Goal: Patient's chronic conditions and co-morbidity symptoms are monitored and maintained or improved  Outcome: Progressing     Problem: Discharge Planning  Goal: Discharge to home or other facility with appropriate resources  Outcome: Progressing     Problem: Skin/Tissue Integrity  Goal: Skin integrity remains intact  Description: 1.  Monitor for areas of redness and/or skin breakdown  2.  Assess vascular access sites hourly  3.  Every 4-6 hours minimum:  Change oxygen saturation probe site  4.  Every 4-6 hours:  If on nasal continuous positive airway pressure, respiratory therapy assess nares and determine need for appliance change or resting period  Outcome: Progressing     Problem: Confusion  Goal: Confusion, delirium, dementia, or psychosis is improved or at baseline  Description: INTERVENTIONS:  1. Assess for possible contributors to thought disturbance, including medications, impaired vision or hearing, underlying metabolic abnormalities, dehydration, psychiatric diagnoses, and notify attending LIP  2. [place] high risk fall precautions, as indicated  3. Provide frequent short contacts to provide reality reorientation, refocusing and direction  4. Decrease environmental stimuli, including noise as appropriate  5. Monitor and intervene to maintain adequate nutrition, hydration, elimination, sleep and activity  6. If unable to ensure safety without constant attention obtain sitter and review sitter guidelines with assigned personnel  7. Initiate Psychosocial CNS and Spiritual Care consult, as indicated  Outcome: Progressing     Problem: Pain  Goal: Verbalizes/displays adequate comfort level or baseline comfort level  Outcome: Progressing     Problem: Nutrition Deficit:  Goal: Optimize nutritional status  Outcome: Progressing

## 2025-03-06 NOTE — PROGRESS NOTES
244 235   MCV 87.3 89.1 87.0   MCH 30.3 30.0 30.1   MCHC 34.7 33.7 34.6   RDW 14.8 15.0 14.8        BMP:  Recent Labs     03/04/25  0453 03/05/25  0445 03/06/25  0448    139 138   K 3.8 4.6 3.7    106 103   CO2 27 24 26   BUN 9 6* 6*   CREATININE 0.6 0.6 0.5*   CALCIUM 9.0 8.5 8.8   GLUCOSE 106* 94 94        Hepatic Function Panel:   Lab Results   Component Value Date/Time    ALKPHOS 87 03/04/2025 04:53 AM    ALT 13 03/04/2025 04:53 AM    AST 20 03/04/2025 04:53 AM    BILITOT 0.5 03/04/2025 04:53 AM       CPK:   Lab Results   Component Value Date    CKTOTAL 118 12/02/2017     ESR: No results found for: \"SEDRATE\"  CRP: No results found for: \"CRP\"        Imaging:    All pertinent images and reports for the current visit were reviewed by me during this visit.  I reviewed the chest x-ray/CT scan/MRI images today and independently interpreted the findings and results today.    MRI BRAIN WO CONTRAST   Final Result   1. Acute 1 cm infarct within the left parietal lobe.   2. No acute intracranial hemorrhage.   3. Remote hemorrhagic infarcts within the right parietal lobe and bilateral   occipital lobes.   4. Severe chronic small vessel ischemic changes.   The findings were sent to the Radiology Results Communication Center at 11:39   am on 3/6/2025 to be communicated to a licensed caregiver.         CT HEAD WO CONTRAST   Final Result   Interval progression of periventricular, deep, and subcortical white matter   hypodensity within the left parietal and occipital lobe when compared to the   2023 exam.  This could represent interval worsening of chronic small vessel   ischemic change.  However, acute edema within the left parietal and occipital   lobe is a possibility and thus, recommend follow-up brain MRI with and   without contrast.      Radiology communication note placed to have information relayed to the   ordering provider at 10:03 p.m. EST 03/03/2025.         XR CHEST PORTABLE   Final Result   No  dictation software. Although every effort was made to ensure the accuracy of this automated transcription, some errors in transcription may have occurred inadvertently. If you may need any clarification, please do not hesitate to contact me through EPIC or at the phone number provided below with my electronic signature.  Any pictures or media included in this note were obtained after taking informed verbal consent from the patient and with their approval to include those in the patient's medical record.        Vidal Thomas MD, MPH, FACP, FIDSA  3/6/2025, 8:57 PM  Central Office Phone: [phone]  Central Office Fax: [phone]    [facility]   [address], [address] ([address])  [place] [address]  [facility] days:  Tuesday & Thursday AM    [facility]  [address] , [address] (Medical office Building)  [place], [place], [address]  [facility] days: Wednesday AM

## 2025-03-06 NOTE — PROGRESS NOTES
PSYCHIATRY CONSULT PROGRESS NOTE    3/6/2025  Ness Prieto  [id number]    CC/Reason for Consult: medication recommendations    S: drowsy, able to mumble some words but doesn't engage in conversation. Not able to tell me where she is or why she is here.   Had zyprexa earlier today for preparation of MRI. Otherwise has not required any IM zyprexa for agitation and no reports of agitation noted in chart.     Behavior issues in last 24hours: none  Reviewed staff/RN notes.     ROS: unable to assess       ertapenem (INVanz) IV  1,000 mg IntraVENous Q24H    risperiDONE  0.25 mg Oral QPM    dilTIAZem  120 mg Oral Daily    atorvastatin  40 mg Oral Dinner    estradiol  1 g Vaginal Daily    aspirin  81 mg Oral Daily    therapeutic multivitamin-minerals  1 tablet Oral Daily    sodium chloride flush  5-40 mL IntraVENous 2 times per day    enoxaparin  40 mg SubCUTAneous Daily     OLANZapine (ZyPREXA) 2.5 mg in sterile water 0.5 mL injection, dextrose bolus **OR** dextrose bolus, glucagon (rDNA), dextrose, melatonin, metoprolol, hydrALAZINE, sodium chloride flush, sodium chloride, potassium chloride **OR** potassium alternative oral replacement **OR** potassium chloride, magnesium sulfate, ondansetron **OR** ondansetron, polyethylene glycol, acetaminophen **OR** acetaminophen, sulfur hexafluoride microspheres    O:  Vitals:    03/06/25 0531 03/06/25 0800 03/06/25 1217 03/06/25 1219   BP: (!) 156/80 (!) 186/89 (!) 169/97 (!) 164/88   Pulse: 77 87 83 83   Resp: 16 16 15    Temp: 97.6 °F (36.4 °C) 97.3 °F (36.3 °C) 97.3 °F (36.3 °C)    TempSrc: Temporal Axillary Axillary    SpO2: 96% 98% 99%    Weight:       Height:           Mental Status Exam:   Appearance    drowsy but arousable  Speech    mumbled, low vol, short responses  Mood/Affect    \"ok\" / flat affect  Thought Process    unable to assess  Thought Content    unable to assess  Associations    unable to assess  Attention/Concentration    impaired  Orientation   person  is present  within the kenn.  There is no ventriculomegaly or abnormal extra-axial fluid  collection present.  The proximal portions of the [place]  demonstrate normal flow voids.    ORBITS: Limited evaluation of the orbits is unremarkable.    SINUSES: The paranasal sinuses and mastoid air cells are clear.    BONES/SOFT TISSUES: Bone marrow signal intensity is normal.    Impression  1. Acute 1 cm infarct within the left parietal lobe.  2. No acute intracranial hemorrhage.  3. Remote hemorrhagic infarcts within the right parietal lobe and bilateral  occipital lobes.  4. Severe chronic small vessel ischemic changes.  The findings were sent to the Radiology [facility] at 11:39  am on 3/6/2025 to be communicated to a licensed caregiver.        A:   Patient has not had any major behavioral issues. She is taking risperidone regularly at night. MRI shows acute left parietal stroke which may be impacting mental status, as well as zyprexa administration earlier today.     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     Dispo: does not require inpatient psych admission    Thank you for this consult, please call the psychiatry consult line for further questions. I will sign off at this time    Julio César Santana MD  Psychiatrist

## 2025-03-06 NOTE — PROGRESS NOTES
Palliative Care:      Patient off unit for testing/MRI. Call to granddaughter/FRANKI Cedeño. Provided update of current status. Gala states she has not had conversations yet with Elisha Gamboa and their expectations with discharge plan.     Gala feels patient mentation will improve with improvement from UTI and with the adjustments that Psychiatry has recommended as of the 3rd. She states last year Elisha Gamboa had made such requests regarding code status and currently wishes are to remain as Full Code.     In regards to \"pocketing food\" again Gala states when patient is not on sedating medications she is able to consume meals with assistance as approved with most recent speech evaluation completed on 3/4/25. In agreement if team feels another speech evaluations is required to pursue if indicated. Undetermined if family would actually pursue peg placement as lisset feels patient cycles in her PO intake at this time. Agrees to revisit should patient fail swallow evaluations consistently.     Buttock/skin breakdown noted at stage II. Consulted with patient Nurse this AM and Wound consult has been placed. Pending. Update of this request provided to lisset.     Note code status unchanged. Overall goal when medically cleared [facility]. Lisset states she will be at bedside this evening after 1700 due to work schedule. Palliative team will continue to follow as needed. Contact information provided on patient communication board. CM and Nurse updated of these discussions.     Electronically signed by Bernarda Mirza RN, BSN, CHPN (Palliative Care) [phone]

## 2025-03-06 NOTE — PROGRESS NOTES
Physician Progress Note      PATIENT:               SPIKE MATTHEWS  CSN #:                  [id number]  :                       1940  ADMIT DATE:       3/2/2025 5:37 PM  DISCH DATE:  RESPONDING  PROVIDER #:        Shiela Hammer MD          QUERY TEXT:    Patient admitted with bradycardia. Noted documentation of sepsis in ID note on   3/3 In order to support the diagnosis of sepsis, please include additional   clinical indicators in your documentation.  Or please document if the   diagnosis of sepsis has been ruled out after further study    The medical record reflects the following:  Risk Factors: 84 y.o female with pMHx of Alzheimer's/dementia, permanent Afib,   and recurrent UTIs  Clinical Indicators: 3/2 - Vs/Labs: R - 19, HR - 58, T - 96.9, WBC - 10.8. 3/3   - ID - Sepsis on admission with tachypnea, bradycardia and hypotension.  Treatment: IV antibiotics, ID consult, UA /CNS, imaging  Options provided:  -- Sepsis was ruled out after study  -- Sepsis present as evidenced by, Please document evidence.  -- Other - I will add my own diagnosis  -- Disagree - Not applicable / Not valid  -- Disagree - Clinically unable to determine / Unknown  -- Refer to Clinical Documentation Reviewer    PROVIDER RESPONSE TEXT:    Sepsis was ruled out after study.    Query created by: Robert River on 3/4/2025 8:05 AM      Electronically signed by:  Shiela Hammer MD 3/6/2025 9:35 AM

## 2025-03-06 NOTE — PLAN OF CARE
Problem: Safety - Adult  Goal: Free from fall injury  3/5/2025 2130 by Joe Villatoro RN  Outcome: Progressing     Problem: Chronic Conditions and Co-morbidities  Goal: Patient's chronic conditions and co-morbidity symptoms are monitored and maintained or improved  3/5/2025 2130 by Joe Villatoro RN  Outcome: Progressing     Problem: Discharge Planning  Goal: Discharge to home or other facility with appropriate resources  3/5/2025 2130 by Joe Villatoro RN  Outcome: Progressing     Problem: Skin/Tissue Integrity  Goal: Skin integrity remains intact  Description: 1.  Monitor for areas of redness and/or skin breakdown  2.  Assess vascular access sites hourly  3.  Every 4-6 hours minimum:  Change oxygen saturation probe site  4.  Every 4-6 hours:  If on nasal continuous positive airway pressure, respiratory therapy assess nares and determine need for appliance change or resting period  3/5/2025 2130 by Joe Villatoro RN  Outcome: Progressing     Problem: Confusion  Goal: Confusion, delirium, dementia, or psychosis is improved or at baseline  Description: INTERVENTIONS:  1. Assess for possible contributors to thought disturbance, including medications, impaired vision or hearing, underlying metabolic abnormalities, dehydration, psychiatric diagnoses, and notify attending LIP  2. [place] high risk fall precautions, as indicated  3. Provide frequent short contacts to provide reality reorientation, refocusing and direction  4. Decrease environmental stimuli, including noise as appropriate  5. Monitor and intervene to maintain adequate nutrition, hydration, elimination, sleep and activity  6. If unable to ensure safety without constant attention obtain sitter and review sitter guidelines with assigned personnel  7. Initiate Psychosocial CNS and Spiritual Care consult, as indicated  3/5/2025 2130 by Joe Villatoro RN  Outcome: Progressing     Problem: Pain  Goal: Verbalizes/displays adequate comfort level or baseline

## 2025-03-07 LAB
ANION GAP SERPL CALCULATED.3IONS-SCNC: 10 MMOL/L (ref 3–16)
BACTERIA BLD CULT ORG #2: NORMAL
BACTERIA BLD CULT: NORMAL
BASOPHILS # BLD: 0 K/UL (ref 0–0.2)
BASOPHILS NFR BLD: 0.7 %
BUN SERPL-MCNC: 4 MG/DL (ref 7–20)
CALCIUM SERPL-MCNC: 8.9 MG/DL (ref 8.3–10.6)
CHLORIDE SERPL-SCNC: 104 MMOL/L (ref 99–110)
CO2 SERPL-SCNC: 26 MMOL/L (ref 21–32)
CREAT SERPL-MCNC: 0.5 MG/DL (ref 0.6–1.2)
DEPRECATED RDW RBC AUTO: 14.9 % (ref 12.4–15.4)
EOSINOPHIL # BLD: 0.1 K/UL (ref 0–0.6)
EOSINOPHIL NFR BLD: 1.8 %
GFR SERPLBLD CREATININE-BSD FMLA CKD-EPI: >90 ML/MIN/{1.73_M2}
GLUCOSE BLD-MCNC: 68 MG/DL (ref 70–99)
GLUCOSE BLD-MCNC: 79 MG/DL (ref 70–99)
GLUCOSE BLD-MCNC: 94 MG/DL (ref 70–99)
GLUCOSE BLD-MCNC: 97 MG/DL (ref 70–99)
GLUCOSE SERPL-MCNC: 92 MG/DL (ref 70–99)
HCT VFR BLD AUTO: 30.4 % (ref 36–48)
HGB BLD-MCNC: 10.1 G/DL (ref 12–16)
LYMPHOCYTES # BLD: 1.1 K/UL (ref 1–5.1)
LYMPHOCYTES NFR BLD: 16.4 %
MCH RBC QN AUTO: 29.6 PG (ref 26–34)
MCHC RBC AUTO-ENTMCNC: 33.3 G/DL (ref 31–36)
MCV RBC AUTO: 88.7 FL (ref 80–100)
MONOCYTES # BLD: 1 K/UL (ref 0–1.3)
MONOCYTES NFR BLD: 13.8 %
NEUTROPHILS # BLD: 4.6 K/UL (ref 1.7–7.7)
NEUTROPHILS NFR BLD: 67.3 %
PERFORMED ON: ABNORMAL
PERFORMED ON: NORMAL
PLATELET # BLD AUTO: 224 K/UL (ref 135–450)
PMV BLD AUTO: 8.2 FL (ref 5–10.5)
POTASSIUM SERPL-SCNC: 3.9 MMOL/L (ref 3.5–5.1)
RBC # BLD AUTO: 3.42 M/UL (ref 4–5.2)
SODIUM SERPL-SCNC: 140 MMOL/L (ref 136–145)
WBC # BLD AUTO: 6.9 K/UL (ref 4–11)

## 2025-03-07 PROCEDURE — G0545 PR INHERENT VISIT TO INPT: HCPCS | Performed by: INTERNAL MEDICINE

## 2025-03-07 PROCEDURE — 99232 SBSQ HOSP IP/OBS MODERATE 35: CPT | Performed by: INTERNAL MEDICINE

## 2025-03-07 PROCEDURE — APPNB30 APP NON BILLABLE TIME 0-30 MINS

## 2025-03-07 PROCEDURE — 02HV33Z INSERTION OF INFUSION DEVICE INTO SUPERIOR VENA CAVA, PERCUTANEOUS APPROACH: ICD-10-PCS | Performed by: INTERNAL MEDICINE

## 2025-03-07 PROCEDURE — 6370000000 HC RX 637 (ALT 250 FOR IP): Performed by: NURSE PRACTITIONER

## 2025-03-07 PROCEDURE — 85025 COMPLETE CBC W/AUTO DIFF WBC: CPT

## 2025-03-07 PROCEDURE — 6360000002 HC RX W HCPCS: Performed by: NURSE PRACTITIONER

## 2025-03-07 PROCEDURE — 36415 COLL VENOUS BLD VENIPUNCTURE: CPT

## 2025-03-07 PROCEDURE — 6360000002 HC RX W HCPCS: Performed by: INTERNAL MEDICINE

## 2025-03-07 PROCEDURE — C1751 CATH, INF, PER/CENT/MIDLINE: HCPCS

## 2025-03-07 PROCEDURE — 6370000000 HC RX 637 (ALT 250 FOR IP): Performed by: PSYCHIATRY & NEUROLOGY

## 2025-03-07 PROCEDURE — 80048 BASIC METABOLIC PNL TOTAL CA: CPT

## 2025-03-07 PROCEDURE — 36569 INSJ PICC 5 YR+ W/O IMAGING: CPT

## 2025-03-07 PROCEDURE — 1200000000 HC SEMI PRIVATE

## 2025-03-07 PROCEDURE — 2580000003 HC RX 258: Performed by: INTERNAL MEDICINE

## 2025-03-07 PROCEDURE — 6370000000 HC RX 637 (ALT 250 FOR IP): Performed by: INTERNAL MEDICINE

## 2025-03-07 RX ORDER — SODIUM CHLORIDE 9 MG/ML
INJECTION, SOLUTION INTRAVENOUS PRN
Status: DISCONTINUED | OUTPATIENT
Start: 2025-03-07 | End: 2025-03-08 | Stop reason: HOSPADM

## 2025-03-07 RX ORDER — LIDOCAINE HYDROCHLORIDE 10 MG/ML
50 INJECTION, SOLUTION EPIDURAL; INFILTRATION; INTRACAUDAL; PERINEURAL ONCE
Status: COMPLETED | OUTPATIENT
Start: 2025-03-07 | End: 2025-03-07

## 2025-03-07 RX ORDER — SODIUM CHLORIDE 0.9 % (FLUSH) 0.9 %
5-40 SYRINGE (ML) INJECTION PRN
Status: DISCONTINUED | OUTPATIENT
Start: 2025-03-07 | End: 2025-03-08 | Stop reason: HOSPADM

## 2025-03-07 RX ORDER — SODIUM CHLORIDE 0.9 % (FLUSH) 0.9 %
5-40 SYRINGE (ML) INJECTION EVERY 12 HOURS SCHEDULED
Status: DISCONTINUED | OUTPATIENT
Start: 2025-03-07 | End: 2025-03-08 | Stop reason: HOSPADM

## 2025-03-07 RX ADMIN — LIDOCAINE HYDROCHLORIDE 50 MG: 10 INJECTION, SOLUTION EPIDURAL; INFILTRATION; INTRACAUDAL; PERINEURAL at 16:00

## 2025-03-07 RX ADMIN — RISPERIDONE 0.25 MG: 0.5 TABLET, FILM COATED ORAL at 16:52

## 2025-03-07 RX ADMIN — SODIUM CHLORIDE, SODIUM LACTATE, POTASSIUM CHLORIDE, CALCIUM CHLORIDE AND DEXTROSE MONOHYDRATE: 5; 600; 310; 30; 20 INJECTION, SOLUTION INTRAVENOUS at 14:44

## 2025-03-07 RX ADMIN — ENOXAPARIN SODIUM 40 MG: 100 INJECTION SUBCUTANEOUS at 08:04

## 2025-03-07 RX ADMIN — DILTIAZEM HYDROCHLORIDE 120 MG: 120 CAPSULE, COATED, EXTENDED RELEASE ORAL at 08:04

## 2025-03-07 RX ADMIN — ATORVASTATIN CALCIUM 40 MG: 40 TABLET, FILM COATED ORAL at 16:52

## 2025-03-07 RX ADMIN — DEXTROSE 125 ML: 10 SOLUTION INTRAVENOUS at 08:01

## 2025-03-07 RX ADMIN — Medication 1 TABLET: at 08:04

## 2025-03-07 RX ADMIN — ERTAPENEM SODIUM 1000 MG: 1 INJECTION INTRAMUSCULAR; INTRAVENOUS at 17:11

## 2025-03-07 RX ADMIN — ASPIRIN 81 MG: 81 TABLET, CHEWABLE ORAL at 08:04

## 2025-03-07 ASSESSMENT — PAIN SCALES - PAIN ASSESSMENT IN ADVANCED DEMENTIA (PAINAD)
BREATHING: NORMAL
CONSOLABILITY: NO NEED TO CONSOLE
BREATHING: NORMAL
CONSOLABILITY: NO NEED TO CONSOLE
BODYLANGUAGE: RELAXED
TOTALSCORE: 0
BODYLANGUAGE: RELAXED
CONSOLABILITY: NO NEED TO CONSOLE
FACIALEXPRESSION: SMILING OR INEXPRESSIVE
TOTALSCORE: 0
FACIALEXPRESSION: SMILING OR INEXPRESSIVE
TOTALSCORE: 0
BREATHING: NORMAL
BODYLANGUAGE: RELAXED
FACIALEXPRESSION: SMILING OR INEXPRESSIVE

## 2025-03-07 ASSESSMENT — PAIN SCALES - GENERAL
PAINLEVEL_OUTOF10: 0

## 2025-03-07 ASSESSMENT — PAIN SCALES - WONG BAKER
WONGBAKER_NUMERICALRESPONSE: NO HURT

## 2025-03-07 NOTE — CARE COORDINATION
Discharge Planning:     (CM) was arranging transport for this evening back to [facility].   After having a conversation with Salas Rowellbrady Cooper. [phone] regarding patient code status.   CM review dietician note from today with Lelia where dietician is stating patient did drink 100% of her ONS.  Cm did explain there was no indication today of patient pocketing food.    Elisha Gamboa agreed to take patient back into her LTC residence there at [facility] and continue discussion regarding placement and code status with granddaughter/POA.    However, CM was informed by nursing that patient does not have a PICC  or Midline for IV antibiotic infusion therapy that patient will need at LTC.    Nursing contact MD to place order for PICC.  An order has been placed, however, at the writing of this note, patient has not yet received her PICC line.    CM team following.    Electronically signed by TIERRA Teran on 3/7/2025 at 4:18 PM

## 2025-03-07 NOTE — PLAN OF CARE
Problem: Safety - Adult  Goal: Free from fall injury  Outcome: Progressing     Problem: Chronic Conditions and Co-morbidities  Goal: Patient's chronic conditions and co-morbidity symptoms are monitored and maintained or improved  Outcome: Progressing     Problem: Discharge Planning  Goal: Discharge to home or other facility with appropriate resources  Outcome: Progressing     Problem: Skin/Tissue Integrity  Goal: Skin integrity remains intact  Description: 1.  Monitor for areas of redness and/or skin breakdown  2.  Assess vascular access sites hourly  3.  Every 4-6 hours minimum:  Change oxygen saturation probe site  4.  Every 4-6 hours:  If on nasal continuous positive airway pressure, respiratory therapy assess nares and determine need for appliance change or resting period  Outcome: Progressing     Problem: Confusion  Goal: Confusion, delirium, dementia, or psychosis is improved or at baseline  Description: INTERVENTIONS:  1. Assess for possible contributors to thought disturbance, including medications, impaired vision or hearing, underlying metabolic abnormalities, dehydration, psychiatric diagnoses, and notify attending LIP  2. [place] high risk fall precautions, as indicated  3. Provide frequent short contacts to provide reality reorientation, refocusing and direction  4. Decrease environmental stimuli, including noise as appropriate  5. Monitor and intervene to maintain adequate nutrition, hydration, elimination, sleep and activity  6. If unable to ensure safety without constant attention obtain sitter and review sitter guidelines with assigned personnel  7. Initiate Psychosocial CNS and Spiritual Care consult, as indicated  Outcome: Progressing     Problem: Pain  Goal: Verbalizes/displays adequate comfort level or baseline comfort level  Outcome: Progressing     Problem: Nutrition Deficit:  Goal: Optimize nutritional status  Outcome: Progressing

## 2025-03-07 NOTE — PROGRESS NOTES
Nutrition Note    RECOMMENDATIONS  PO Diet: Pureed  ONS: Ensure Plus HP TID  Consider an appetite stimulant     ASSESSMENT   Nutrition intervention triggered for f/u.  Pt receives a pureed diet & is taking bites of food at best.  No po intake consumed yesterday; pt drank 100% Ensure this am.  Suspect poor intake is related to cognition from demenita.  No new wt to review for changes.  Consider an appetite stimulant & con't ONS with meals TID.       Malnutrition Status: At risk for malnutrition  Chronic Illness    NUTRITION DIAGNOSIS   Inadequate oral intake related to cognitive or neurological impairment, inadequate protein-energy intake as evidenced by intake 0-25%, other (dementia)  Increased nutrient needs related to increase demand for energy/nutrients as evidenced by wounds    Goals: PO intake 50% or greater     NUTRITION RELATED FINDINGS  Objective: LBM 3/5; no edema noted; labs reviewed  Wounds: Stage II    CURRENT NUTRITION THERAPIES  ADULT ORAL NUTRITION SUPPLEMENT; Breakfast, Lunch, Dinner; Standard High Calorie/High Protein Oral Supplement  ADULT DIET; Dysphagia - Pureed       PO Intake: 0%, 1-25%   PO Supplement Intake:% (x1)    ANTHROPOMETRICS  Current Height: 167.6 cm (5' 6\")  Current Weight - Scale: 44.5 kg (98 lb)      COMPARATIVE STANDARDS  Total Energy Requirements (kcals/day): 1335- 1556     Protein (g):  67- 89g       Fluid (mL/day):  1556 ml/day    EDUCATION  Education/Counseling not indicated     The patient will be monitored per nutrition standards of care. Consult dietitian if additional nutrition interventions are needed prior to RD reassessment.     Melony Vences, ZACHARIAH, LD    Contact: [phone]

## 2025-03-07 NOTE — PLAN OF CARE
Problem: Safety - Adult  Goal: Free from fall injury  3/6/2025 2139 by Ja Duncan RN  Outcome: Progressing     Problem: Chronic Conditions and Co-morbidities  Goal: Patient's chronic conditions and co-morbidity symptoms are monitored and maintained or improved  3/6/2025 2139 by Ja Duncan RN  Outcome: Progressing     Problem: Discharge Planning  Goal: Discharge to home or other facility with appropriate resources  3/6/2025 2139 by Ja Duncan RN  Outcome: Progressing     Problem: Skin/Tissue Integrity  Goal: Skin integrity remains intact  Description: 1.  Monitor for areas of redness and/or skin breakdown  2.  Assess vascular access sites hourly  3.  Every 4-6 hours minimum:  Change oxygen saturation probe site  4.  Every 4-6 hours:  If on nasal continuous positive airway pressure, respiratory therapy assess nares and determine need for appliance change or resting period  3/6/2025 2139 by Ja Duncan RN  Outcome: Progressing  Flowsheets (Taken 3/6/2025 2139)  Skin Integrity Remains Intact: Monitor for areas of redness and/or skin breakdown

## 2025-03-07 NOTE — CONSULTS
PICC line education:    -Risks  -Benefits  -Alternatives  -Procedure    Discussed the above with patient, verbalized understanding, answered all questions. Provided with information on PICC care to review. PICC tip verified via 3CG (Ok to use). Reported off to patient's  Nurse 4t RN.

## 2025-03-07 NOTE — PROGRESS NOTES
Speech Language Pathology  [facility]  [phone]  SLP Dysphagia Treatment   Attempt      Patient: Ness Prieto   : 1940   MRN: [id number]      Evaluation Date: 3/7/2025      Admitting Dx: Loss of consciousness (HCC) [R40.20]  Junctional bradycardia [R00.1]  Acute cystitis without hematuria [N30.00]  Treatment Diagnosis: Oropharyngeal Dysphagia     Attempted to see pt for follow up therapy to assess diet tolerance. Reviewed chart and discussed with RN. Per RN, pt refusing to eat, she was offered both breakfast and lunch multiple times with no success \"I don't want to\". RN was able to get pt to drink Ensure successfully, \"successive drinks\" without any difficulty. Per RN, pt refused any other foods/drinks offered on the floor. Will re-attempt follow up as schedule permits and pt is able to participate.     Electronically signed by:    Adela Castelan M.A. [facility] SRosendoPRosendo [id number]  Speech-Language Pathologist   3/7/2025 3:03 PM

## 2025-03-07 NOTE — PROGRESS NOTES
Asked by hospitalist service to speak with patient's granddaughter regarding MRI results.      I was able to call and speak with patient's granddaughter Gala.  We discussed MRI brain results which showed small left parietal ischemic stroke.      We discussed patient's history of multiple remote strokes as well as stroke risk factors.  Patient with multiple comorbidities making her a higher risk for stroke recurrence.       Family interested in following up with neurology outpatient.  The patient was previously seen by  neurology outpatient.  All questions answered to the best of my abilities.

## 2025-03-07 NOTE — PROGRESS NOTES
V2.0    [facility] Progress Note      Name:  Ness Prieto /Age/Sex: 1940  (84 y.o. female)   MRN & CSN:  [id number] & [id number] Encounter Date/Time: 3/7/2025 6:06 PM EST   Location:  [id number] PCP: Parish Strickland MD     Attending:Penny Han MD       Hospital Day: 6    Brief course   This 84 y.o. female with PMHx of of Alzheimer's dementia, hx CVA, recurrent UTIs, MDRO[facility], bilateral AKA, hypertension, anemia, chronic A-fib, s/p watchman implant who was found unresponsive and sweaty at the nursing home.  Pt reportedly alert and interactive on ED presentation.  EKG on admission showed junctional rhythm with bradycardia.       Interval history:  Pt seen and examined today.  Overnight events noted, interval ancillary notes and labs reviewed.   On RA satting well.  Afebrile overnight, WBCs WNL.  Blood cultures NGTD  Patient resting in bed; was able to open eyes on verbal command and answer few questions.  Granddaughter has some concern that patient is at impaired vision but patient was able to see and  Recognize objects and count number of fingers.  Denies any blurry vision or diplopia   working on placement        Assessment and plan:    Acute encephalopathy likely delirium in setting of UTI/CVA/recent bradycardia episode.  Patient with known Hx of Alzheimer disease  Psychiatry consulted.  Depakote sprinkle and olanzapine DC'd.  Switch to risperidone 0.25 mg nightly.  Continue as needed olanzapine 2.5 mg IM twice daily.    Acute left frontal ischemic stroke; thromboembolic versus cardioembolic  CT head showed interval progression of periventricular, deep and subcortical white matter hypodensity within left parietal and occipital lobe compared to CT .   Neurology on board; patient underwent MRI brain which showed acute 1 cm left parietal stroke.  Acute intracranial hemorrhage.Remote hemorrhagic infarct within right parietal lobe& bilateral occipital  26 26   BUN 6* 6* 4*   CREATININE 0.6 0.5* 0.5*   GLUCOSE 94 94 92     Hepatic:   No results for input(s): \"AST\", \"ALT\", \"BILITOT\", \"ALKPHOS\" in the last 72 hours.    Invalid input(s): \"ALB\"    Lipids:   Lab Results   Component Value Date/Time    CHOL 103 09/19/2023 04:37 AM    HDL 49 09/19/2023 04:37 AM    TRIG 38 09/19/2023 04:37 AM     Hemoglobin A1C:   Lab Results   Component Value Date/Time    LABA1C 5.1 09/19/2023 04:37 AM     TSH:   Lab Results   Component Value Date/Time    TSH 2.37 03/04/2025 04:53 AM     Troponin: No results found for: \"TROPONINT\"  Lactic Acid: No results for input(s): \"LACTA\" in the last 72 hours.  BNP: No results for input(s): \"PROBNP\" in the last 72 hours.  UA:  Lab Results   Component Value Date/Time    NITRU Negative 03/02/2025 07:28 PM    COLORU DARK YELLOW 03/02/2025 07:28 PM    PHUR 7.5 03/02/2025 07:28 PM    PHUR 5.5 09/18/2023 10:32 PM    WBCUA 110 03/02/2025 07:28 PM    RBCUA 12 03/02/2025 07:28 PM    BACTERIA 4+ 03/02/2025 07:28 PM    CLARITYU TURBID 03/02/2025 07:28 PM    LEUKOCYTESUR LARGE 03/02/2025 07:28 PM    UROBILINOGEN 1.0 03/02/2025 07:28 PM    BILIRUBINUR SMALL 03/02/2025 07:28 PM    BLOODU SMALL 03/02/2025 07:28 PM    GLUCOSEU Negative 03/02/2025 07:28 PM    KETUA TRACE 03/02/2025 07:28 PM     Urine Cultures:   Lab Results   Component Value Date/Time    LABURIN  03/02/2025 07:28 PM     >100,000 CFU/ml  CONTACT PRECAUTIONS INDICATED  Carbapenem antibiotics are the best option for infections caused  by ESBL producing organisms.  Other antibiotic classes are  likely to result in treatment failure, even for organisms  demonstrating in vitro susceptibility.       Blood Cultures:   Lab Results   Component Value Date/Time    BC  03/03/2025 01:07 PM     No Growth to date.  Any change in status will be called.     Lab Results   Component Value Date/Time    BLOODCULT2  03/03/2025 01:08 PM     No Growth to date.  Any change in status will be called.     Organism:   Lab Results

## 2025-03-07 NOTE — PROGRESS NOTES
Infectious Diseases   Progress Note      Admission Date: 3/2/2025  Hospital Day: Hospital Day: 6   Attending: Penny Han MD  Date of service: 3/7/2025     Chief complaint/ Reason for consult:     Altered mental status on admission  Sepsis on admission with tachypnea, bradycardia and hypotension  ESBL Klebsiella urinary tract infection  Need for contact isolation  History of stroke  Chronic atrial fibrillation  Essential hypertension  Mixed hyperlipidemia    Microbiology:      I have reviewed allavailable micro lab data and cultures    Results       Procedure Component Value Units Date/Time    Culture, Blood 2 [[id number]] Collected: 03/03/25 1308    Order Status: Completed Specimen: Blood Updated: 03/07/25 1415     Culture, Blood 2 No Growth after 4 days of incubation.    Narrative:      ORDER#: [id number]                          ORDERED BY: BRANDEN THOMASON  SOURCE: Blood Hand, Right                  COLLECTED:  03/03/25 13:08  ANTIBIOTICS AT SYD.:                      RECEIVED :  03/03/25 20:42  If child <=2 yrs old please draw pediatric bottle.~Blood Culture #2    Culture, Blood 1 [[id number]] Collected: 03/03/25 1307    Order Status: Completed Specimen: Blood Updated: 03/07/25 1415     Blood Culture, Routine No Growth after 4 days of incubation.    Narrative:      ORDER#: [id number]                          ORDERED BY: BRANDEN THOMASON  SOURCE: Blood Hand, Right                  COLLECTED:  03/03/25 13:07  ANTIBIOTICS AT SYD.:                      RECEIVED :  03/03/25 20:42  If child <=2 yrs old please draw pediatric bottle.~Blood Culture 1    Culture, Urine [[id number]]  (Abnormal)  (Susceptibility) Collected: 03/02/25 1928    Order Status: Completed Specimen: Urine, clean catch Updated: 03/06/25 0612     Organism Klebsiella pneumoniae ESBL     Urine Culture, Routine --     >100,000 CFU/ml  CONTACT PRECAUTIONS INDICATED  Carbapenem antibiotics are the best option for infections caused  by ESBL producing

## 2025-03-08 VITALS
OXYGEN SATURATION: 97 % | SYSTOLIC BLOOD PRESSURE: 143 MMHG | RESPIRATION RATE: 16 BRPM | WEIGHT: 98 LBS | DIASTOLIC BLOOD PRESSURE: 86 MMHG | TEMPERATURE: 97.6 F | HEIGHT: 66 IN | HEART RATE: 93 BPM | BODY MASS INDEX: 15.75 KG/M2

## 2025-03-08 LAB
ANION GAP SERPL CALCULATED.3IONS-SCNC: 7 MMOL/L (ref 3–16)
BASOPHILS # BLD: 0.1 K/UL (ref 0–0.2)
BASOPHILS NFR BLD: 0.9 %
BUN SERPL-MCNC: 14 MG/DL (ref 7–20)
CALCIUM SERPL-MCNC: 8.6 MG/DL (ref 8.3–10.6)
CHLORIDE SERPL-SCNC: 104 MMOL/L (ref 99–110)
CO2 SERPL-SCNC: 30 MMOL/L (ref 21–32)
CREAT SERPL-MCNC: 0.6 MG/DL (ref 0.6–1.2)
DEPRECATED RDW RBC AUTO: 14.8 % (ref 12.4–15.4)
EOSINOPHIL # BLD: 0.2 K/UL (ref 0–0.6)
EOSINOPHIL NFR BLD: 2.4 %
GFR SERPLBLD CREATININE-BSD FMLA CKD-EPI: 88 ML/MIN/{1.73_M2}
GLUCOSE BLD-MCNC: 79 MG/DL (ref 70–99)
GLUCOSE BLD-MCNC: 90 MG/DL (ref 70–99)
GLUCOSE SERPL-MCNC: 88 MG/DL (ref 70–99)
HCT VFR BLD AUTO: 25.7 % (ref 36–48)
HGB BLD-MCNC: 8.6 G/DL (ref 12–16)
LYMPHOCYTES # BLD: 1.4 K/UL (ref 1–5.1)
LYMPHOCYTES NFR BLD: 20.4 %
MCH RBC QN AUTO: 29.8 PG (ref 26–34)
MCHC RBC AUTO-ENTMCNC: 33.3 G/DL (ref 31–36)
MCV RBC AUTO: 89.4 FL (ref 80–100)
MONOCYTES # BLD: 0.6 K/UL (ref 0–1.3)
MONOCYTES NFR BLD: 9.5 %
NEUTROPHILS # BLD: 4.5 K/UL (ref 1.7–7.7)
NEUTROPHILS NFR BLD: 66.8 %
PERFORMED ON: NORMAL
PERFORMED ON: NORMAL
PLATELET # BLD AUTO: 215 K/UL (ref 135–450)
PMV BLD AUTO: 8.2 FL (ref 5–10.5)
POTASSIUM SERPL-SCNC: 3.7 MMOL/L (ref 3.5–5.1)
RBC # BLD AUTO: 2.88 M/UL (ref 4–5.2)
SODIUM SERPL-SCNC: 141 MMOL/L (ref 136–145)
WBC # BLD AUTO: 6.7 K/UL (ref 4–11)

## 2025-03-08 PROCEDURE — 6360000002 HC RX W HCPCS: Performed by: NURSE PRACTITIONER

## 2025-03-08 PROCEDURE — 6370000000 HC RX 637 (ALT 250 FOR IP): Performed by: NURSE PRACTITIONER

## 2025-03-08 PROCEDURE — 2500000003 HC RX 250 WO HCPCS: Performed by: NURSE PRACTITIONER

## 2025-03-08 PROCEDURE — 85025 COMPLETE CBC W/AUTO DIFF WBC: CPT

## 2025-03-08 PROCEDURE — 2580000003 HC RX 258: Performed by: INTERNAL MEDICINE

## 2025-03-08 PROCEDURE — 6360000002 HC RX W HCPCS: Performed by: INTERNAL MEDICINE

## 2025-03-08 PROCEDURE — 2500000003 HC RX 250 WO HCPCS: Performed by: INTERNAL MEDICINE

## 2025-03-08 PROCEDURE — 80048 BASIC METABOLIC PNL TOTAL CA: CPT

## 2025-03-08 RX ORDER — RISPERIDONE 0.25 MG/1
0.25 TABLET ORAL EVERY EVENING
Qty: 60 TABLET | Refills: 0 | Status: SHIPPED | OUTPATIENT
Start: 2025-03-08

## 2025-03-08 RX ORDER — METOPROLOL SUCCINATE 50 MG/1
50 TABLET, EXTENDED RELEASE ORAL DAILY
Qty: 30 TABLET | Refills: 0
Start: 2025-03-08 | End: 2025-04-07

## 2025-03-08 RX ORDER — DILTIAZEM HYDROCHLORIDE 120 MG/1
120 CAPSULE, COATED, EXTENDED RELEASE ORAL DAILY
Qty: 30 CAPSULE | Refills: 0
Start: 2025-03-08

## 2025-03-08 RX ADMIN — Medication 1 TABLET: at 09:52

## 2025-03-08 RX ADMIN — Medication 10 ML: at 12:11

## 2025-03-08 RX ADMIN — ENOXAPARIN SODIUM 40 MG: 100 INJECTION SUBCUTANEOUS at 09:52

## 2025-03-08 RX ADMIN — ERTAPENEM SODIUM 1000 MG: 1 INJECTION INTRAMUSCULAR; INTRAVENOUS at 13:02

## 2025-03-08 RX ADMIN — DILTIAZEM HYDROCHLORIDE 120 MG: 120 CAPSULE, COATED, EXTENDED RELEASE ORAL at 09:51

## 2025-03-08 RX ADMIN — ASPIRIN 81 MG: 81 TABLET, CHEWABLE ORAL at 09:51

## 2025-03-08 ASSESSMENT — PAIN SCALES - PAIN ASSESSMENT IN ADVANCED DEMENTIA (PAINAD)
CONSOLABILITY: NO NEED TO CONSOLE
TOTALSCORE: 0
FACIALEXPRESSION: SMILING OR INEXPRESSIVE
BREATHING: NORMAL
BODYLANGUAGE: RELAXED

## 2025-03-08 ASSESSMENT — PAIN SCALES - WONG BAKER: WONGBAKER_NUMERICALRESPONSE: NO HURT

## 2025-03-08 ASSESSMENT — PAIN SCALES - GENERAL: PAINLEVEL_OUTOF10: 0

## 2025-03-08 NOTE — PLAN OF CARE
Problem: Safety - Adult  Goal: Free from fall injury  3/7/2025 2011 by Ja Duncan RN  Outcome: Progressing     Problem: Chronic Conditions and Co-morbidities  Goal: Patient's chronic conditions and co-morbidity symptoms are monitored and maintained or improved  3/7/2025 2011 by Ja Duncan RN  Outcome: Progressing     Problem: Discharge Planning  Goal: Discharge to home or other facility with appropriate resources  3/7/2025 2011 by Ja Duncan RN  Outcome: Progressing     Problem: Skin/Tissue Integrity  Goal: Skin integrity remains intact  Description: 1.  Monitor for areas of redness and/or skin breakdown  2.  Assess vascular access sites hourly  3.  Every 4-6 hours minimum:  Change oxygen saturation probe site  4.  Every 4-6 hours:  If on nasal continuous positive airway pressure, respiratory therapy assess nares and determine need for appliance change or resting period  3/7/2025 2011 by Ja Duncan, RN  Outcome: Progressing

## 2025-03-08 NOTE — DISCHARGE SUMMARY
Hospital Medicine Discharge Summary    Patient ID: Ness Prieto      Patient's PCP: Parish Strickland MD    Admit Date: 3/2/2025     Discharge Date: 3/8/2025     Admitting Physician: Xavier Nina MD     Discharge Physician: ARIES MAY MD     Hospital Course: This 84 y.o. female with PMHx of of Alzheimer's dementia, hx CVA, recurrent UTIs, MDRO, [facility] resident, bilateral AKA, hypertension, anemia, chronic A-fib, s/p watchman implant who was brought to the hospital after found unresponsive at nursing home.       Acute encephalopathy likely delirium in setting of UTI/CVA/recent bradycardia episode;  Improved  Patient with known Hx of Alzheimer disease. Psychiatry consulted. Depakote and olanzapine DC'd.  Switched to risperidone 0.25 mg nightly.     Acute left frontal ischemic stroke; thromboembolic versus cardioembolic  CT head showed interval progression of periventricular, deep and subcortical white matter hypodensity within left parietal and occipital lobe compared to CT 2023.   Neurology consulted;  pt underwent MRI brain which showed acute 1 cm left parietal stroke. Remote hemorrhagic infarct within right parietal lobe& bilateral occipital lobes.  Continue aspirin and statins    ESBL Klebsiella UTI; Urine culture grew> 100% Klebsiella pneumonia  Treated with IV meropenem during hospital stay and discharged on IV ertapenem per ID recs    Loss of consciousness: ?  Due to bradycardia; resolved  Folate, B12 nd ammonia level.     Bradycardia: Resolved.  Toprol dose decreased per cardiology recs     Hx of longstanding A-fib;  s/p Watchman.  Follows with [facility].    EP consulted; recommended reducing dose of Toprol-XL and Cardizem to 120 mg daily.  Not on anticoagulation.  Continue aspirin     Oropharyngeal dysphagia: SLP evaluation appreciated dysphagia diet     Mild troponin elevation: hs-troponin 19-20-16.  Not consistent with ACS  No acute ischemia on EKG.. TTE 3/3/2025: LVEF 60 to 65%.  Normal wall

## 2025-03-08 NOTE — CARE COORDINATION
Case Management -  Discharge Note      Patient Name: Ness Prieto                   YOB: 1940  Room: [address] [place], [place] [id number]            Readmission Risk (Low < 19, Mod (19-27), High > 27): Readmission Risk Score: 13.1    Current PCP: Parish Strickland MD      (IMM) Important Message from Medicare:    Has pt received appropriate compliance notices before being discharged if required: NA  Compliance doc:  [] 2nd IMM; [] Code 44 [] Horta  Date Given: NA Given By:     PT AM-PAC:   /24  OT AM-PAC:   /24    Discharge Plan:  Patient discharge to:    [facility]  [address] [place] [address]  Phone: [phone]  Fax: [phone]       SW faxed YAMILETH and AVS to [phone]    RN-  to call report to [phone]    Medical transport with [facility], 1230pm  time     Family advised of discharge and in agreement.     [facility] agency notified of discharge:  [] Yes [] No  [x] NA    Family notified of discharge:  [x] Yes  [] No  [] NA    Facility notified of discharge:  [x] Yes  [] No  [] NA    Pt is being discharged with Outpt IV Antibiotics  [x] Yes [] No  [] NA  If yes, make sure YAMILETH is faxed to C agency, and meds are called in to pharmacy by RN from YAMILETH orders only.      Financial    Payor: [place] / Plan: [place]PT [place] / Product Type: *No Product type* /     Pharmacy:  Potential assistance Purchasing Medications: No  Meds-to-Beds request: No      [id number] - [place], [place] - [address] [place] [address] [phone] -  [phone]  [address] [place] [address]  Phone: [phone] Fax: [phone]      Notes:    Additional Case Management Notes: Patient will discharge to [facility] today. Patient,family and facility made aware. No further needs at this time.    Electronically signed by January Suráez on 3/8/25 at 9:27 AM EST

## 2025-03-08 NOTE — PROGRESS NOTES
1322-Called  [phone]  to [facility] to give report but no one answered. Gave report to transport and removed IV. Patient is going to Nursing facility with single lumen PICC.

## 2025-03-10 ENCOUNTER — TELEPHONE (OUTPATIENT)
Dept: INFECTIOUS DISEASES | Age: 85
End: 2025-03-10

## 2025-03-10 DIAGNOSIS — N39.0 URINARY TRACT INFECTION DUE TO ESBL KLEBSIELLA: Primary | ICD-10-CM

## 2025-03-10 DIAGNOSIS — B96.89 URINARY TRACT INFECTION DUE TO ESBL KLEBSIELLA: Primary | ICD-10-CM

## 2025-03-10 NOTE — TELEPHONE ENCOUNTER
Spoke with nurse Bourgeois and verified OPAT orders as follows:      Name and dose of Antimicrobial: IV ertapenem 1 g every 24 hours   Antimicrobial start date: calculated from 3/5/2025   Antimicrobial completion date planned: 3/12/2025   Lab monitoring: CBC with differential, LFT, Serum Creatinine once a week   No f/u

## 2025-03-11 LAB
BASOPHILS ABSOLUTE: 0.1 /ΜL
BASOPHILS RELATIVE PERCENT: 1.4 %
BUN / CREAT RATIO: ABNORMAL
BUN BLDV-MCNC: ABNORMAL MG/DL
CREAT SERPL-MCNC: 0.5 MG/DL
EOSINOPHILS ABSOLUTE: 0.3 /ΜL
EOSINOPHILS RELATIVE PERCENT: 4.6 %
HCT VFR BLD CALC: 25.5 % (ref 36–46)
HEMOGLOBIN: 8.7 G/DL (ref 12–16)
LYMPHOCYTES ABSOLUTE: 1.5 /ΜL
LYMPHOCYTES RELATIVE PERCENT: 25.4 %
MCH RBC QN AUTO: 30.4 PG
MCHC RBC AUTO-ENTMCNC: 34.2 G/DL
MCV RBC AUTO: 89 FL
MONOCYTES ABSOLUTE: 0.5 /ΜL
MONOCYTES RELATIVE PERCENT: 9.8 %
NEUTROPHILS ABSOLUTE: 3.5 /ΜL
NEUTROPHILS RELATIVE PERCENT: 58.8 %
PDW BLD-RTO: 14.2 %
PLATELET # BLD: 214 K/ΜL
PMV BLD AUTO: 8.4 FL
RBC # BLD: 2.86 10^6/ΜL
WBC # BLD: 5.9 10^3/ML

## 2025-03-12 DIAGNOSIS — B96.89 URINARY TRACT INFECTION DUE TO ESBL KLEBSIELLA: Primary | ICD-10-CM

## 2025-03-12 DIAGNOSIS — N39.0 URINARY TRACT INFECTION DUE TO ESBL KLEBSIELLA: Primary | ICD-10-CM

## 2025-03-12 NOTE — TELEPHONE ENCOUNTER
Spoke with patient's nurse Fabienne who states patient is afebrile and VSS but not back to normal mentation. Patient has h/o Alzheimer's but will normally take her pills. She has been refusing pills since returning to the facility. Poor po intake, good urine output per nurses aide in her brief. Labs abstracted for review. Will defer back to OPAT team for review before terming IV abx

## 2025-03-12 NOTE — TELEPHONE ENCOUNTER
Labs look okay.  We are still okay to end.  Mentation may take a while to fully return if it does.

## 2025-03-12 NOTE — TELEPHONE ENCOUNTER
As long as patient is doing okay and heading back to baseline mentation, okay to end IV ertapenem as planned.     Please let SNF know they should continue any instruction from  urology.   Of note- please let them know that her 3/2/25 urine culture grew ESBL kleb pneumo and we unfortunately do not have fosfomycin sensis.

## 2025-03-12 NOTE — TELEPHONE ENCOUNTER
OPAT End  Call made to SNF: spoke with nurse Johnston and advised of pharmacist note to term OPAT and resume fosfa and f[facility]u with [facility], she v/u    Will f/u in 1-2 days to verify line removal

## 2025-03-14 NOTE — TELEPHONE ENCOUNTER
Spoke with patient's nurse who states they have not removed PICC line yet, advised order was given 3-12 to remove after last dose and she v/u. States she will get an RN to pull it. Will f/u Monday